# Patient Record
Sex: MALE | Race: WHITE | Employment: FULL TIME | URBAN - METROPOLITAN AREA
[De-identification: names, ages, dates, MRNs, and addresses within clinical notes are randomized per-mention and may not be internally consistent; named-entity substitution may affect disease eponyms.]

---

## 2017-01-18 NOTE — PATIENT INSTRUCTIONS
Preventive Health Recommendations  Male Ages 50   64    Yearly exam:             See your health care provider every year in order to  o   Review health changes.   o   Discuss preventive care.    o   Review your medicines if your doctor has prescribed any.     Have a cholesterol test every 5 years, or more frequently if you are at risk for high cholesterol/heart disease.     Have a diabetes test (fasting glucose) every three years. If you are at risk for diabetes, you should have this test more often.     Have a colonoscopy at age 50, or have a yearly FIT test (stool test). These exams will check for colon cancer.      Talk with your health care provider about whether or not a prostate cancer screening test (PSA) is right for you.    You should be tested each year for STDs (sexually transmitted diseases), if you re at risk.     Shots: Get a flu shot each year. Get a tetanus shot every 10 years.     Nutrition:    Eat at least 5 servings of fruits and vegetables daily.     Eat whole-grain bread, whole-wheat pasta and brown rice instead of white grains and rice.     Talk to your provider about Calcium and Vitamin D.     Lifestyle    Exercise for at least 150 minutes a week (30 minutes a day, 5 days a week). This will help you control your weight and prevent disease.     Limit alcohol to one drink per day.     No smoking.     Wear sunscreen to prevent skin cancer.     See your dentist every six months for an exam and cleaning.     See your eye doctor every 1 to 2 years.      We will send you lab results    Schedule with eye doctor    Advise schedule the upper and lower endoscopy exams    For the tremor, see neurology if desired

## 2017-01-18 NOTE — PROGRESS NOTES
"  SUBJECTIVE:     CC: Jorge Rivas is an 51 year old male who presents for preventative health visit.     Healthy Habits:    Do you get at least three servings of calcium containing foods daily (dairy, green leafy vegetables, etc.)? yes    Amount of exercise or daily activities, outside of work: 2 day(s) per week    Problems taking medications regularly No    Medication side effects: No    Have you had an eye exam in the past two years? no    Do you see a dentist twice per year? yes    Do you have sleep apnea, excessive snoring or daytime drowsiness?no        None    Always had a little shaking/ tremor, worse recently    Decreased libido    Hands shake    Others notice it     No speech / coordination problems    Occasionally vision gets \"screwed up\" for a while    Wondering if something gets in eyes    Goes away after a while    No contacts or glasses    lasik 3 or 4 years ago, not as sharp vision as he would like    Some halos    Not much exercise    Tries to stay active    Lots of stress    Not as much free time    viagra works, not used much      Today's PHQ-2 Score:   PHQ-2 ( 1999 Pfizer) 1/20/2017 2/23/2015   Q1: Little interest or pleasure in doing things 0 0   Q2: Feeling down, depressed or hopeless 0 3   PHQ-2 Score 0 3       Abuse: Current or Past(Physical, Sexual or Emotional)- No  Do you feel safe in your environment - Yes    Social History   Substance Use Topics     Smoking status: Former Smoker     Quit date: 01/01/2006     Smokeless tobacco: Never Used     Alcohol Use: Yes     The patient does not drink >3 drinks per day nor >7 drinks per week.    Last PSA:   PSA   Date Value Ref Range Status   03/10/2015 1.46 0 - 4 ug/L Final       Recent Labs   Lab Test  03/10/15   0702  09/11/09   1556   CHOL  166  164   HDL  43  42   LDL  102  109   TRIG  107  71   CHOLHDLRATIO  3.9  4.0       Reviewed orders with patient. Reviewed health maintenance and updated orders accordingly - Yes    All Histories " "reviewed and updated in Epic.      ROS:  C: NEGATIVE for fever, chills, change in weight  CONSTITUTIONAL:pt would like to lose some wt he states  I: NEGATIVE for worrisome rashes, moles or lesions  EYES: see above   ENT: NEGATIVE for ear, mouth and throat problems  R: NEGATIVE for significant cough or SOB  CV: NEGATIVE for chest pain, palpitations or peripheral edema  GI: NEGATIVE for nausea, abdominal pain, heartburn, or change in bowel habits   male: still erection problems but not needing viagra from [facility]; [facility]  M: NEGATIVE for significant arthralgias or myalgia  N: NEGATIVE for weakness, dizziness or paresthesias  P: NEGATIVE for changes in mood or affect  PSYCHIATRIC: lots of stress        OBJECTIVE:     /81 mmHg  Pulse 63  Temp(Src) 97.9  F (36.6  C) (Oral)  Ht 6' 1.62\" (1.87 m)  Wt 233 lb (105.688 kg)  BMI 30.22 kg/m2  EXAM:  GENERAL: healthy, alert and no distress  HENT: ear canals and TM's normal, nose and mouth without ulcers or lesions  NECK: no adenopathy, no asymmetry, masses, or scars and thyroid normal to palpation  RESP: lungs clear to auscultation - no rales, rhonchi or wheezes  CV: regular rate and rhythm, normal S1 S2, no S3 or S4, no murmur, click or rub, no peripheral edema and peripheral pulses strong  ABDOMEN: soft, nontender, no hepatosplenomegaly, no masses and bowel sounds normal   (male): normal male genitalia without lesions or urethral discharge, no hernia  RECTAL: normal sphincter tone, no rectal masses, prostate normal size, smooth, nontender without nodules or masses  MS: no gross musculoskeletal defects noted, no edema  SKIN: no suspicious lesions or rashes  NEURO: Normal strength and tone, mentation intact and speech normal  PSYCH: mentation appears normal, affect normal/bright  When patient holds his hands out in front he has a moderate tremor, not there all the time.  No resting or pill rolling type tremor      ASSESSMENT/PLAN:     Jorge was seen " "today for physical, health maintenance and other.    Diagnoses and all orders for this visit:    Encounter for preventative adult health care exam with abnormal findings    Gastroesophageal reflux disease, esophagitis presence not specified  -     GASTROENTEROLOGY ADULT REF PROCEDURE ONLY    Screen for colon cancer  -     GASTROENTEROLOGY ADULT REF PROCEDURE ONLY    Screening examination for infectious disease  -     Hepatitis C antibody    Fatigue, unspecified type  -     Comprehensive metabolic panel  -     TSH with free T4 reflex  -     CBC with platelets differential  -     Testosterone total    Screening for prostate cancer  -     Prostate spec antigen screen    CARDIOVASCULAR SCREENING; LDL GOAL LESS THAN 100  -     Lipid panel reflex to direct LDL    Vision problem  -     OPTOMETRY REFERRAL    Tremor  -     NEUROLOGY ADULT REFERRAL    Other orders  -     Cancel: Fecal colorectal cancer screen FIT; Future    Discussed multiple issues with patient  Keep working on healthy diet/exercise   He has never had colonoscopy.  He has had some gerd for long time.  Prudent to do both egd and colonoscopy.  I put in order.  He will call and schedule.  Check labs.   Advised patient see neurology for the tremor  Patient to schedule with eye doctor for full eye exam     COUNSELING:  Reviewed preventive health counseling, as reflected in patient instructions       Regular exercise       Healthy diet/nutrition       Vision screening       Safe sex practices/STD prevention       Colon cancer screening       Prostate cancer screening         reports that he quit smoking about 11 years ago. He has never used smokeless tobacco.    Estimated body mass index is 30.22 kg/(m^2) as calculated from the following:    Height as of this encounter: 6' 1.62\" (1.87 m).    Weight as of this encounter: 233 lb (105.688 kg).   Weight management plan: Discussed healthy diet and exercise guidelines and patient will follow up in 12 months in clinic to " re-evaluate.    Counseling Resources:  ATP IV Guidelines  Pooled Cohorts Equation Calculator  FRAX Risk Assessment  ICSI Preventive Guidelines  Dietary Guidelines for Americans, 2010  USDA's MyPlate  ASA Prophylaxis  Lung CA Screening    Jadyon Mead MD  [facility]      ROS      Physical Exam

## 2017-01-20 ENCOUNTER — OFFICE VISIT (OUTPATIENT)
Dept: FAMILY MEDICINE | Facility: CLINIC | Age: 52
End: 2017-01-20
Payer: COMMERCIAL

## 2017-01-20 VITALS
TEMPERATURE: 97.9 F | HEIGHT: 74 IN | WEIGHT: 233 LBS | DIASTOLIC BLOOD PRESSURE: 81 MMHG | HEART RATE: 63 BPM | BODY MASS INDEX: 29.9 KG/M2 | SYSTOLIC BLOOD PRESSURE: 116 MMHG

## 2017-01-20 DIAGNOSIS — K21.9 GASTROESOPHAGEAL REFLUX DISEASE, ESOPHAGITIS PRESENCE NOT SPECIFIED: ICD-10-CM

## 2017-01-20 DIAGNOSIS — Z12.11 SCREEN FOR COLON CANCER: ICD-10-CM

## 2017-01-20 DIAGNOSIS — Z12.5 SCREENING FOR PROSTATE CANCER: ICD-10-CM

## 2017-01-20 DIAGNOSIS — H54.7 VISION PROBLEM: ICD-10-CM

## 2017-01-20 DIAGNOSIS — R25.1 TREMOR: ICD-10-CM

## 2017-01-20 DIAGNOSIS — Z00.01 ENCOUNTER FOR PREVENTATIVE ADULT HEALTH CARE EXAM WITH ABNORMAL FINDINGS: Primary | ICD-10-CM

## 2017-01-20 DIAGNOSIS — R53.83 FATIGUE, UNSPECIFIED TYPE: ICD-10-CM

## 2017-01-20 DIAGNOSIS — Z11.9 SCREENING EXAMINATION FOR INFECTIOUS DISEASE: ICD-10-CM

## 2017-01-20 DIAGNOSIS — Z13.6 CARDIOVASCULAR SCREENING; LDL GOAL LESS THAN 100: ICD-10-CM

## 2017-01-20 PROCEDURE — 84403 ASSAY OF TOTAL TESTOSTERONE: CPT | Performed by: FAMILY MEDICINE

## 2017-01-20 PROCEDURE — 80061 LIPID PANEL: CPT | Performed by: FAMILY MEDICINE

## 2017-01-20 PROCEDURE — G0103 PSA SCREENING: HCPCS | Performed by: FAMILY MEDICINE

## 2017-01-20 PROCEDURE — 86803 HEPATITIS C AB TEST: CPT | Performed by: FAMILY MEDICINE

## 2017-01-20 PROCEDURE — 99396 PREV VISIT EST AGE 40-64: CPT | Performed by: FAMILY MEDICINE

## 2017-01-20 PROCEDURE — 80050 GENERAL HEALTH PANEL: CPT | Performed by: FAMILY MEDICINE

## 2017-01-20 PROCEDURE — 36415 COLL VENOUS BLD VENIPUNCTURE: CPT | Performed by: FAMILY MEDICINE

## 2017-01-20 ASSESSMENT — PAIN SCALES - GENERAL: PAINLEVEL: NO PAIN (0)

## 2017-01-20 NOTE — NURSING NOTE
"Chief Complaint   Patient presents with     Physical     Health Maintenance     Other     bpa and my chart       Initial /81 mmHg  Pulse 63  Temp(Src) 97.9  F (36.6  C) (Oral)  Ht 6' 1.62\" (1.87 m)  Wt 233 lb (105.688 kg)  BMI 30.22 kg/m2 Estimated body mass index is 30.22 kg/(m^2) as calculated from the following:    Height as of this encounter: 6' 1.62\" (1.87 m).    Weight as of this encounter: 233 lb (105.688 kg).  BP completed using cuff size: regular taken on the left arm  Ale Montgomery CMA      "

## 2017-01-20 NOTE — MR AVS SNAPSHOT
After Visit Summary   1/20/2017    Jorge Rivas    MRN: [id number]           Patient Information     Date Of Birth          1965        Visit Information        Provider Department      1/20/2017 3:40 PM Jaydon Mead MD [facility]        Today's Diagnoses     Gastroesophageal reflux disease, esophagitis presence not specified    -  1     Screen for colon cancer         Screening examination for infectious disease         Fatigue, unspecified type         Screening for prostate cancer         CARDIOVASCULAR SCREENING; LDL GOAL LESS THAN 100         Vision problem         Tremor           Care Instructions      Preventive Health Recommendations  Male Ages 50 - 64    Yearly exam:             See your health care provider every year in order to  o   Review health changes.   o   Discuss preventive care.    o   Review your medicines if your doctor has prescribed any.     Have a cholesterol test every 5 years, or more frequently if you are at risk for high cholesterol/heart disease.     Have a diabetes test (fasting glucose) every three years. If you are at risk for diabetes, you should have this test more often.     Have a colonoscopy at age 50, or have a yearly FIT test (stool test). These exams will check for colon cancer.      Talk with your health care provider about whether or not a prostate cancer screening test (PSA) is right for you.    You should be tested each year for STDs (sexually transmitted diseases), if you re at risk.     Shots: Get a flu shot each year. Get a tetanus shot every 10 years.     Nutrition:    Eat at least 5 servings of fruits and vegetables daily.     Eat whole-grain bread, whole-wheat pasta and brown rice instead of white grains and rice.     Talk to your provider about Calcium and Vitamin D.     Lifestyle    Exercise for at least 150 minutes a week (30 minutes a day, 5 days a week). This will help you control your weight and  prevent disease.     Limit alcohol to one drink per day.     No smoking.     Wear sunscreen to prevent skin cancer.     See your dentist every six months for an exam and cleaning.     See your eye doctor every 1 to 2 years.      We will send you lab results    Schedule with eye doctor    Advise schedule the upper and lower endoscopy exams    For the tremor, see neurology if desired        Follow-ups after your visit        Additional Services     GASTROENTEROLOGY ADULT REF PROCEDURE ONLY       Last Lab Result: CREATININE (mg/dL)       Date                     Value                 09/11/2009               0.99             ----------      WE ARE ORDERING BOTH EGD AND COLONOSCOPY; GERD AND SCREEN COLON CA   There is no weight on file to calculate BMI.      Patient will be contacted to schedule procedure.     Please be aware that coverage of these services is subject to the terms and limitations of your health insurance plan.  Call member services at your health plan with any benefit or coverage questions.  Any procedures must be performed at a [facility] facility OR coordinated by your clinic's referral office.    Please bring the following with you to your appointment:    (1) Any X-Rays, CTs or MRIs which have been performed.  Contact the facility where they were done to arrange for  prior to your scheduled appointment.    (2) List of current medications   (3) This referral request   (4) Any documents/labs given to you for this referral            NEUROLOGY ADULT REFERRAL       Your provider has referred you to: [facility]: [facility] [phone]   [email or web address]  [facility]: [facility] [phone]   [email or web address]  [facility]: [facility] [phone]   [email or web address]  [facility]: [facility] [phone]    [email or web address]  UM: Neu[facility] Clinic [facility] [phone]   [email or web address]  General Neurology    Reason for Referral: Consult    Please be aware that coverage of these services is subject to the terms and limitations of your health insurance plan.  Call member services at your health plan with any benefit or coverage questions.      Please bring the following with you to your appointment:    (1) Any X-Rays, CTs or MRIs which have been performed.  Contact the facility where they were done to arrange for  prior to your scheduled appointment.    (2) List of current medications  (3) This referral request   (4) Any documents/labs given to you for this referral            OPTOMETRY REFERRAL       Your provider has referred you to: [facility]: [facility] [phone]   [email or web address]    Please be aware that coverage of these services is subject to the terms and limitations of your health insurance plan.  Call member services at your health plan with any benefit or coverage questions.      Please bring the following with you to your appointment:    (1) Any X-Rays, CTs or MRIs which have been performed.  Contact the facility where they were done to arrange for  prior to your scheduled appointment.    (2) List of current medications  (3) This referral request   (4) Any documents/labs given to you for this referral                  Who to contact     If you have questions or need follow up information about today's clinic visit or your schedule please contact [facility] directly at [phone].  Normal or non-critical lab and imaging results will be communicated to you by MyChart, letter or phone within 4 business days after the clinic has received the results. If you do not hear from us within 7 days, please contact the clinic through [employer]hart or  "phone. If you have a critical or abnormal lab result, we will notify you by phone as soon as possible.  Submit refill requests through [employer] or call your pharmacy and they will forward the refill request to us. Please allow 3 business days for your refill to be completed.          Additional Information About Your Visit        Paid To Party LLChart Information     VideoPros lets you send messages to your doctor, view your test results, renew your prescriptions, schedule appointments and more. To sign up, go to [email or web address] . Click on \"Log in\" on the left side of the screen, which will take you to the Welcome page. Then click on \"Sign up Now\" on the right side of the page.     You will be asked to enter the access code listed below, as well as some personal information. Please follow the directions to create your username and password.     Your access code is: [email or web address]  Expires: 2017  4:47 PM     Your access code will  in 90 days. If you need help or a new code, please call your [facility] clinic or [phone].        Care EveryWhere ID     This is your Care EveryWhere ID. This could be used by other organizations to access your [facility] medical records  [id number]        Your Vitals Were     Pulse Temperature Height BMI (Body Mass Index)          63 97.9  F (36.6  C) (Oral) 6' 1.62\" (1.87 m) 30.22 kg/m2         Blood Pressure from Last 3 Encounters:   17 116/81   10/14/16 125/82   16 108/73    Weight from Last 3 Encounters:   17 233 lb (105.688 kg)   10/14/16 234 lb (106.142 kg)   16 228 lb (103.42 kg)              We Performed the Following     CBC with platelets differential     Comprehensive metabolic panel     GASTROENTEROLOGY ADULT REF PROCEDURE ONLY     Hepatitis C antibody     Lipid panel reflex to direct LDL     NEUROLOGY ADULT REFERRAL     OPTOMETRY REFERRAL     Prostate spec antigen screen     Testosterone total     TSH with free T4 reflex        Primary Care Provider " Office Phone # Fax #    Jaydon Mead [phone]       [facility] [address]  [place]. \[facility]ospital [facility] and Outlook.\"" [phone]        Thank you!     Thank you for choosing [facility]  for your care. Our goal is always to provide you with excellent care. Hearing back from our patients is one way we can continue to improve our services. Please take a few minutes to complete the written survey that you may receive in the mail after your visit with us. Thank you!             Your Updated Medication List - Protect others around you: Learn how to safely use, store and throw away your medicines at www.disposemymeds.org.          This list is accurate as of: 1/20/17  4:47 PM.  Always use your most recent med list.                   Brand Name Dispense Instructions for use    aspirin 81 MG tablet      Take by mouth daily       DAILY MULTIVITAMIN PO          fluocinonide 0.05 % cream    LIDEX    30 g    Apply sparingly to affected area twice daily for 14 days.  Do not apply to face.       sildenafil 100 MG cap/tab    VIAGRA    6 tablet    Take 0.5-1 tablets ( mg) by mouth daily as needed for erectile dysfunction Take 30 min to 4 hours before intercourse.  Never use with nitroglycerin, terazosin or doxazosin.

## 2017-01-20 NOTE — Clinical Note
[facility]  [address] [place]  [address]  [phone]        January 26, 2017    Jorge Rivas  [address] [id number]        Dear Jorge,    Your cholesterol is mildly elevated.  Keep working on healthy diet/exercise.     Other labs are all fine.    Results for orders placed or performed in visit on 01/20/17   Hepatitis C antibody   Result Value Ref Range    Hepatitis C Antibody  NR     Nonreactive   Assay performance characteristics have not been established for newborns,   infants, and children     Lipid panel reflex to direct LDL   Result Value Ref Range    Cholesterol 203 (H) <200 mg/dL    Triglycerides 106 <150 mg/dL    HDL Cholesterol 47 >39 mg/dL    LDL Cholesterol Calculated 135 (H) <100 mg/dL    Non HDL Cholesterol 156 (H) <130 mg/dL   Comprehensive metabolic panel   Result Value Ref Range    Sodium 139 133 - 144 mmol/L    Potassium 4.3 3.4 - 5.3 mmol/L    Chloride 103 94 - 109 mmol/L    Carbon Dioxide 27 20 - 32 mmol/L    Anion Gap 9 3 - 14 mmol/L    Glucose 87 70 - 99 mg/dL    Urea Nitrogen 22 7 - 30 mg/dL    Creatinine 1.02 0.66 - 1.25 mg/dL    GFR Estimate 77 >60 mL/min/1.7m2    GFR Estimate If Black >90   GFR Calc   >60 mL/min/1.7m2    Calcium 9.0 8.5 - 10.1 mg/dL    Bilirubin Total 0.4 0.2 - 1.3 mg/dL    Albumin 4.2 3.4 - 5.0 g/dL    Protein Total 7.2 6.8 - 8.8 g/dL    Alkaline Phosphatase 62 40 - 150 U/L    ALT 58 0 - 70 U/L    AST 25 0 - 45 U/L   TSH with free T4 reflex   Result Value Ref Range    TSH 2.01 0.40 - 4.00 mU/L   CBC with platelets differential   Result Value Ref Range    WBC 6.4 4.0 - 11.0 10e9/L    RBC Count 4.88 4.4 - 5.9 10e12/L    Hemoglobin 15.1 13.3 - 17.7 g/dL    Hematocrit 43.1 40.0 - 53.0 %    MCV 88 78 - 100 fl    MCH 30.9 26.5 - 33.0 pg    MCHC 35.0 31.5 - 36.5 g/dL    RDW 11.9 10.0 - 15.0 %    Platelet Count 208 150 - 450 10e9/L    Diff Method Automated Method     % Neutrophils  44.8 %    % Lymphocytes 39.5 %    % Monocytes 11.1 %    % Eosinophils 4.1 %    % Basophils 0.5 %    Absolute Neutrophil 2.9 1.6 - 8.3 10e9/L    Absolute Lymphocytes 2.5 0.8 - 5.3 10e9/L    Absolute Monocytes 0.7 0.0 - 1.3 10e9/L    Absolute Eosinophils 0.3 0.0 - 0.7 10e9/L    Absolute Basophils 0.0 0.0 - 0.2 10e9/L   Prostate spec antigen screen   Result Value Ref Range    PSA 1.29 0 - 4 ug/L   Testosterone total   Result Value Ref Range    Testosterone Total 356 240 - 950 ng/dL       If you have any questions please call the clinic at [phone].    Sincerely,    Jaydon BAYL

## 2017-01-23 LAB
ALBUMIN SERPL-MCNC: 4.2 G/DL (ref 3.4–5)
ALP SERPL-CCNC: 62 U/L (ref 40–150)
ALT SERPL W P-5'-P-CCNC: 58 U/L (ref 0–70)
ANION GAP SERPL CALCULATED.3IONS-SCNC: 9 MMOL/L (ref 3–14)
AST SERPL W P-5'-P-CCNC: 25 U/L (ref 0–45)
BASOPHILS # BLD AUTO: 0 10E9/L (ref 0–0.2)
BASOPHILS NFR BLD AUTO: 0.5 %
BILIRUB SERPL-MCNC: 0.4 MG/DL (ref 0.2–1.3)
BUN SERPL-MCNC: 22 MG/DL (ref 7–30)
CALCIUM SERPL-MCNC: 9 MG/DL (ref 8.5–10.1)
CHLORIDE SERPL-SCNC: 103 MMOL/L (ref 94–109)
CHOLEST SERPL-MCNC: 203 MG/DL
CO2 SERPL-SCNC: 27 MMOL/L (ref 20–32)
CREAT SERPL-MCNC: 1.02 MG/DL (ref 0.66–1.25)
DIFFERENTIAL METHOD BLD: NORMAL
EOSINOPHIL # BLD AUTO: 0.3 10E9/L (ref 0–0.7)
EOSINOPHIL NFR BLD AUTO: 4.1 %
ERYTHROCYTE [DISTWIDTH] IN BLOOD BY AUTOMATED COUNT: 11.9 % (ref 10–15)
GFR SERPL CREATININE-BSD FRML MDRD: 77 ML/MIN/1.7M2
GLUCOSE SERPL-MCNC: 87 MG/DL (ref 70–99)
HCT VFR BLD AUTO: 43.1 % (ref 40–53)
HCV AB SERPL QL IA: NORMAL
HDLC SERPL-MCNC: 47 MG/DL
HGB BLD-MCNC: 15.1 G/DL (ref 13.3–17.7)
LDLC SERPL CALC-MCNC: 135 MG/DL
LYMPHOCYTES # BLD AUTO: 2.5 10E9/L (ref 0.8–5.3)
LYMPHOCYTES NFR BLD AUTO: 39.5 %
MCH RBC QN AUTO: 30.9 PG (ref 26.5–33)
MCHC RBC AUTO-ENTMCNC: 35 G/DL (ref 31.5–36.5)
MCV RBC AUTO: 88 FL (ref 78–100)
MONOCYTES # BLD AUTO: 0.7 10E9/L (ref 0–1.3)
MONOCYTES NFR BLD AUTO: 11.1 %
NEUTROPHILS # BLD AUTO: 2.9 10E9/L (ref 1.6–8.3)
NEUTROPHILS NFR BLD AUTO: 44.8 %
NONHDLC SERPL-MCNC: 156 MG/DL
PLATELET # BLD AUTO: 208 10E9/L (ref 150–450)
POTASSIUM SERPL-SCNC: 4.3 MMOL/L (ref 3.4–5.3)
PROT SERPL-MCNC: 7.2 G/DL (ref 6.8–8.8)
PSA SERPL-ACNC: 1.29 UG/L (ref 0–4)
RBC # BLD AUTO: 4.88 10E12/L (ref 4.4–5.9)
SODIUM SERPL-SCNC: 139 MMOL/L (ref 133–144)
TRIGL SERPL-MCNC: 106 MG/DL
TSH SERPL DL<=0.005 MIU/L-ACNC: 2.01 MU/L (ref 0.4–4)
WBC # BLD AUTO: 6.4 10E9/L (ref 4–11)

## 2017-01-25 LAB — TESTOST SERPL-MCNC: 356 NG/DL (ref 240–950)

## 2017-01-25 NOTE — PROGRESS NOTES
Quick Note:    Your cholesterol is mildly elevated. Keep working on healthy diet/exercise.     Other labs are all fine.    Jaydon Mead MD    ______

## 2017-02-09 ENCOUNTER — OFFICE VISIT (OUTPATIENT)
Dept: FAMILY MEDICINE | Facility: CLINIC | Age: 52
End: 2017-02-09
Payer: COMMERCIAL

## 2017-02-09 VITALS
TEMPERATURE: 97.4 F | DIASTOLIC BLOOD PRESSURE: 77 MMHG | BODY MASS INDEX: 30.09 KG/M2 | HEART RATE: 61 BPM | WEIGHT: 232 LBS | SYSTOLIC BLOOD PRESSURE: 118 MMHG | OXYGEN SATURATION: 100 %

## 2017-02-09 DIAGNOSIS — Z11.3 SCREEN FOR STD (SEXUALLY TRANSMITTED DISEASE): Primary | ICD-10-CM

## 2017-02-09 DIAGNOSIS — N52.9 ERECTILE DYSFUNCTION, UNSPECIFIED ERECTILE DYSFUNCTION TYPE: ICD-10-CM

## 2017-02-09 PROCEDURE — 99213 OFFICE O/P EST LOW 20 MIN: CPT | Performed by: PHYSICIAN ASSISTANT

## 2017-02-09 PROCEDURE — 87591 N.GONORRHOEAE DNA AMP PROB: CPT | Performed by: PHYSICIAN ASSISTANT

## 2017-02-09 PROCEDURE — 86780 TREPONEMA PALLIDUM: CPT | Performed by: PHYSICIAN ASSISTANT

## 2017-02-09 PROCEDURE — 87491 CHLMYD TRACH DNA AMP PROBE: CPT | Performed by: PHYSICIAN ASSISTANT

## 2017-02-09 PROCEDURE — 36415 COLL VENOUS BLD VENIPUNCTURE: CPT | Performed by: PHYSICIAN ASSISTANT

## 2017-02-09 PROCEDURE — 87389 HIV-1 AG W/HIV-1&-2 AB AG IA: CPT | Performed by: PHYSICIAN ASSISTANT

## 2017-02-09 PROCEDURE — 87340 HEPATITIS B SURFACE AG IA: CPT | Performed by: PHYSICIAN ASSISTANT

## 2017-02-09 NOTE — MR AVS SNAPSHOT
After Visit Summary   2/9/2017    Jorge Rivas    MRN: [id number]           Patient Information     Date Of Birth          1965        Visit Information        Provider Department      2/9/2017 3:20 PM Nayeli Guidry PA-C [facility]        Today's Diagnoses     Screen for STD (sexually transmitted disease)    -  1     Erectile dysfunction, unspecified erectile dysfunction type            Follow-ups after your visit        Additional Services     UROLOGY ADULT REFERRAL       Your provider has referred you to: G: [facility] [phone]   [email or web address]    Please be aware that coverage of these services is subject to the terms and limitations of your health insurance plan.  Call member services at your health plan with any benefit or coverage questions.      Please bring the following with you to your appointment:    (1) Any X-Rays, CTs or MRIs which have been performed.  Contact the facility where they were done to arrange for  prior to your scheduled appointment.    (2) List of current medications  (3) This referral request   (4) Any documents/labs given to you for this referral                  Your next 10 appointments already scheduled     Feb 09, 2017  3:20 PM   SHORT with Nayeli Guidry PA-C   [facility] ([facility])    [address] [place] [id number] [phone]              Who to contact     If you have questions or need follow up information about today's clinic visit or your schedule please contact [facility] directly at [phone].  Normal or non-critical lab and imaging results will be communicated to you by MyChart, letter or phone within 4 business days after the clinic has received the results. If you do not hear from us within 7 days, please contact the clinic through MyChart or phone.  "If you have a critical or abnormal lab result, we will notify you by phone as soon as possible.  Submit refill requests through MollyWatr or call your pharmacy and they will forward the refill request to us. Please allow 3 business days for your refill to be completed.          Additional Information About Your Visit        Yottaahart Information     MollyWatr lets you send messages to your doctor, view your test results, renew your prescriptions, schedule appointments and more. To sign up, go to [email or web address] . Click on \"Log in\" on the left side of the screen, which will take you to the Welcome page. Then click on \"Sign up Now\" on the right side of the page.     You will be asked to enter the access code listed below, as well as some personal information. Please follow the directions to create your username and password.     Your access code is: [email or web address]  Expires: 2017  4:47 PM     Your access code will  in 90 days. If you need help or a new code, please call your [place] clinic or [phone].        Care EveryWhere ID     This is your Care EveryWhere ID. This could be used by other organizations to access your [place] medical records  [id number]        Your Vitals Were     Pulse Temperature Pulse Oximetry             61 97.4  F (36.3  C) (Oral) 100%          Blood Pressure from Last 3 Encounters:   17 118/77   17 116/81   10/14/16 125/82    Weight from Last 3 Encounters:   17 232 lb (105.235 kg)   17 233 lb (105.688 kg)   10/14/16 234 lb (106.142 kg)              We Performed the Following     Anti Treponema     Chlamydia trachomatis PCR     Hepatitis B surface antigen     HIV Antigen Antibody Combo     Neisseria gonorrhoeae PCR     UROLOGY ADULT REFERRAL        Primary Care Provider Office Phone # Fax #    Jaydon Mead [phone]       [address] [address]        Thank you!     Thank you for " choosing [facility]  for your care. Our goal is always to provide you with excellent care. Hearing back from our patients is one way we can continue to improve our services. Please take a few minutes to complete the written survey that you may receive in the mail after your visit with us. Thank you!             Your Updated Medication List - Protect others around you: Learn how to safely use, store and throw away your medicines at www.disposemymeds.org.          This list is accurate as of: 2/9/17  3:18 PM.  Always use your most recent med list.                   Brand Name Dispense Instructions for use    aspirin 81 MG tablet      Take by mouth daily       DAILY MULTIVITAMIN PO          fluocinonide 0.05 % cream    LIDEX    30 g    Apply sparingly to affected area twice daily for 14 days.  Do not apply to face.       sildenafil 100 MG cap/tab    VIAGRA    6 tablet    Take 0.5-1 tablets ( mg) by mouth daily as needed for erectile dysfunction Take 30 min to 4 hours before intercourse.  Never use with nitroglycerin, terazosin or doxazosin.

## 2017-02-09 NOTE — LETTER
[facility]  [address] [place]  [address]  [phone]        February 14, 2017    Jorge Rivas  [address] [address]        Dear Jorge,    Your STD testing is all negative.     Results for orders placed or performed in visit on 02/09/17   Hepatitis B surface antigen   Result Value Ref Range    Hep B Surface Agn Nonreactive NR   HIV Antigen Antibody Combo   Result Value Ref Range    HIV Antigen Antibody Combo  NR     Nonreactive   HIV-1 p24 Ag & HIV-1/HIV-2 Ab Not Detected     Anti Treponema   Result Value Ref Range    Treponema pallidum Antibody Negative NEG   Neisseria gonorrhoeae PCR   Result Value Ref Range    Specimen Descrip Urine     N Gonorrhea PCR  NEG     Negative   Negative for N. gonorrhoeae rRNA by transcription mediated amplification.   A negative result by transcription mediated amplification does not preclude the   presence of N. gonorrhoeae infection because results are dependent on proper   and adequate collection, absence of inhibitors, and sufficient rRNA to be   detected.     Chlamydia trachomatis PCR   Result Value Ref Range    Specimen Description Urine     Chlamydia Trachomatis PCR  NEG     Negative   Negative for C. trachomatis rRNA by transcription mediated amplification.   A negative result by transcription mediated amplification does not preclude the   presence of C. trachomatis infection because results are dependent on proper   and adequate collection, absence of inhibitors, and sufficient rRNA to be   detected.         If you have any questions please call the clinic at [phone].    Sincerely,    Nayeli PFEIFFER

## 2017-02-09 NOTE — PROGRESS NOTES
SUBJECTIVE:                                                    Jorge Rivas is a 51 year old male who presents to clinic today for the following health issues:    Patient here for STD screen for preventive care. No discharge, urination, or other symptoms.    Started dating again. Recently had a negative Hep C. May have had the Hep B injections.     History of erectile dysfunction that he takes Viagra for. Hasn't taken it often in the past 2 years but [facility]. Notes family history of erectile dysfunction.    Many questions regarding lipid results and etiology of his erectile dysfunction.    Appears to have more questions but declined further concerns at this time.    Problem list and histories reviewed & adjusted, as indicated.  Additional history: as documented    Problem list, Medication list, Allergies, and Medical/Social/Surgical histories reviewed in EPIC and updated as appropriate.    ROS:  Constitutional, HEENT, cardiovascular, pulmonary, gi and gu systems are negative, except as otherwise noted.    OBJECTIVE:                                                    /77 mmHg  Pulse 61  Temp(Src) 97.4  F (36.3  C) (Oral)  Wt 232 lb (105.235 kg)  SpO2 100%  Body mass index is 30.09 kg/(m^2).  GENERAL: healthy, alert and no distress    Diagnostic Test Results:  No results found for this or any previous visit (from the past 24 hour(s)).     ASSESSMENT/PLAN:                                                    1. Screen for STD (sexually transmitted disease)  Patient would like STD testing.  - Hepatitis B surface antigen  - Neisseria gonorrhoeae PCR  - Chlamydia trachomatis PCR  - HIV Antigen Antibody Combo  - Anti Treponema    2. Erectile dysfunction, unspecified erectile dysfunction type  Many questions about etiology of his erectile dysfunction. Will put in urology referral that patient can schedule at his convenience.  - UROLOGY ADULT REFERRAL    Follow up with PCP as  scheduled.    KAYCEE Serrato-student  KAYCEE Sargent-C  [facility]  The student Magui Schmidt PAS2 acted as a scribe and the encounter documented above was completely performed by myself and the documentation reflects the work I have performed today.   Nayeli JOSEC   >50% of the visit spent in counseling and coordination of care. Visit length 15 minutes.

## 2017-02-09 NOTE — NURSING NOTE
"Chief Complaint   Patient presents with     STD     check        Initial /77 mmHg  Pulse 61  Temp(Src) 97.4  F (36.3  C) (Oral)  Wt 232 lb (105.235 kg)  SpO2 100% Estimated body mass index is 30.09 kg/(m^2) as calculated from the following:    Height as of 1/20/17: 6' 1.62\" (1.87 m).    Weight as of this encounter: 232 lb (105.235 kg).  Medication Reconciliation: complete   Kristin Orozco CMA      "

## 2017-02-10 LAB
C TRACH DNA SPEC QL NAA+PROBE: NORMAL
HBV SURFACE AG SERPL QL IA: NONREACTIVE
HIV 1+2 AB+HIV1 P24 AG SERPL QL IA: NORMAL
N GONORRHOEA DNA SPEC QL NAA+PROBE: NORMAL
SPECIMEN SOURCE: NORMAL
SPECIMEN SOURCE: NORMAL
T PALLIDUM IGG+IGM SER QL: NEGATIVE

## 2017-06-01 ENCOUNTER — TELEPHONE (OUTPATIENT)
Dept: FAMILY MEDICINE | Facility: CLINIC | Age: 52
End: 2017-06-01

## 2017-06-01 NOTE — LETTER
June 1, 2017    Jorge Rivas  [address] [address]    Dear Jorge    We care about your health and have reviewed your health plan. We have reviewed your medical conditions, medication list, and lab results and are making recommendations based on this review, to better manage your health.    You are in particular need of attention regarding:  - Scheduling a Colon Cancer Screening (Colonoscopy only) [phone]      Here is a list of Health Maintenance topics that are due now or due soon:  Health Maintenance Due   Topic Date Due     COLON CANCER SCREEN (SYSTEM ASSIGNED)  11/07/2014     We will be calling you in the next couple of weeks to help you schedule any appointments that are needed.  Please call us at [phone] (or use Lemoptix) to address the above recommendations.     Thank you for trusting [facility] and we appreciate the opportunity to serve you.  We look forward to supporting your healthcare needs in the future.    Healthy Regards,    MD Ale Mays CMA

## 2017-06-01 NOTE — TELEPHONE ENCOUNTER
Panel Management Review      Patient has the following on his problem list: None      Composite cancer screening  Chart review shows that this patient is due/due soon for the following Colonoscopy  Summary:    Patient is due/failing the following:   COLONOSCOPY    Action needed:   Patient needs referral/order: colon order done.    Type of outreach:    Sent letter. 06/01/2017    Questions for provider review:    None                                                                                                                                    Ale [facility]       Chart routed to Care Team .

## 2017-06-09 NOTE — TELEPHONE ENCOUNTER
I called and left message for patient to call back regarding scheduling colonoscopy  Ale Montgomery CMA

## 2017-06-30 NOTE — TELEPHONE ENCOUNTER
I called and left message for patient to call back regarding scheduling a colonoscopy. Final letter mailed  Ale Montgomery CMA

## 2017-07-19 ENCOUNTER — TELEPHONE (OUTPATIENT)
Dept: GASTROENTEROLOGY | Facility: OUTPATIENT CENTER | Age: 52
End: 2017-07-19

## 2017-07-19 DIAGNOSIS — Z12.11 ENCOUNTER FOR SCREENING COLONOSCOPY: Primary | ICD-10-CM

## 2017-07-19 NOTE — TELEPHONE ENCOUNTER
Patient taking any blood thinners ? 81 mg aspirin    Heart disease ? denies    Lung disease ?denies     Sleep apnea ?denies    Diabetic ?denies    Kidney disease ?denies    Dialysis ? n/a    Electronic implanted medical devices ? denies    Are you taking any narcotic pain medication ? no  What is your daily dosage ?    PTSD ? n/a    Prep instructions reviewed with patient ? Instructions,  policy, MAC sedation plan reviewed. Advised patient to have someone stay with him post exam    Ph[facility]acy : [facility]    Indication for procedure :   Diagnoses      Gastroesophageal reflux disease, esophagitis presence not specified [K21.9]         Screen for colon cancer [Z12.11]             Referring provider :  Providers      Authorizing Provider Encounter Provider     Jaydon Mead MD

## 2017-07-25 ENCOUNTER — DOCUMENTATION ONLY (OUTPATIENT)
Dept: GASTROENTEROLOGY | Facility: OUTPATIENT CENTER | Age: 52
End: 2017-07-25

## 2017-07-25 ENCOUNTER — TRANSFERRED RECORDS (OUTPATIENT)
Dept: HEALTH INFORMATION MANAGEMENT | Facility: CLINIC | Age: 52
End: 2017-07-25

## 2017-07-28 LAB — COPATH REPORT: NORMAL

## 2017-09-12 ENCOUNTER — OFFICE VISIT (OUTPATIENT)
Dept: FAMILY MEDICINE | Facility: CLINIC | Age: 52
End: 2017-09-12
Payer: COMMERCIAL

## 2017-09-12 VITALS
HEART RATE: 67 BPM | DIASTOLIC BLOOD PRESSURE: 72 MMHG | BODY MASS INDEX: 32.43 KG/M2 | TEMPERATURE: 98.3 F | WEIGHT: 250 LBS | SYSTOLIC BLOOD PRESSURE: 115 MMHG | OXYGEN SATURATION: 97 %

## 2017-09-12 DIAGNOSIS — M77.8 RIGHT WRIST TENDINITIS: ICD-10-CM

## 2017-09-12 DIAGNOSIS — M79.644 THUMB PAIN, RIGHT: Primary | ICD-10-CM

## 2017-09-12 PROCEDURE — 99213 OFFICE O/P EST LOW 20 MIN: CPT | Performed by: NURSE PRACTITIONER

## 2017-09-12 ASSESSMENT — PAIN SCALES - GENERAL: PAINLEVEL: SEVERE PAIN (6)

## 2017-09-12 NOTE — PROGRESS NOTES
SUBJECTIVE:   Jorge Rivas is a 51 year old male who presents to clinic today for the following health issues:      Joint Pain    Onset: Since Labor day weekend    Description:   Location: Right hand mostly and his left hand a little around his thumb joint.  Character: Dull ache    Intensity: moderate    Progression of Symptoms: worse    Accompanying Signs & Symptoms:  Other symptoms: weakness of his hand, his     History:   Previous similar pain: no       Precipitating factors:   Trauma or overuse: no     Alleviating factors:  Improved by: nothing and Ibuprofen    Therapies Tried and outcome: a little bit    Pain in bilateral hands (R>L)  Thumb joints  Developed quickly 1 week ago  No other joint pain  No known tick bites  Denies history of injury  No rashes  Weak, painful  on the right  Denies fever, chills, nausea, vomiting  Pain improved with ibuprofen  Pain improving over past week    Problem list and histories reviewed & adjusted, as indicated.  Additional history: none    Patient Active Problem List   Diagnosis     CAD (coronary artery disease)     CARDIOVASCULAR SCREENING; LDL GOAL LESS THAN 100     Erectile dysfunction, unspecified erectile dysfunction type     Past Surgical History:   Procedure Laterality Date     C REPAIR CRUCIATE LIGAMENT,KNEE  late 90s    R knee       Social History   Substance Use Topics     Smoking status: Former Smoker     Quit date: 1/1/2006     Smokeless tobacco: Never Used     Alcohol use 0.0 oz/week     0 Standard drinks or equivalent per week      Comment: variable     Family History   Problem Relation Age of Onset     DIABETES Mother      Cardiovascular Mother      Prostate Cancer Paternal Uncle              Reviewed and updated as needed this visit by clinical staffTobacco  Allergies  Meds  Med Hx  Surg Hx  Fam Hx  Soc Hx      Reviewed and updated as needed this visit by Provider         ROS:  Noncontributory except as above    OBJECTIVE:     BP  115/72 (BP Location: Right arm, Patient Position: Chair, Cuff Size: Adult Large)  Pulse 67  Temp 98.3  F (36.8  C) (Oral)  Wt 250 lb (113.4 kg)  SpO2 97%  BMI 32.43 kg/m2  Body mass index is 32.43 kg/(m^2).  GENERAL: healthy, alert and no distress  MS: Upper extremity strength 5/5 and symmetric. Negative squeeze test bilateral. No pain to palpation MCP joints. Negative finkelstein test bilaterally. Pain to palpation right distal radius. No swelling or masses  SKIN: no suspicious lesions or rashes,no erythema or ecchymosis  NEURO: Normal strength and tone, mentation intact and speech normal    Diagnostic Test Results:  none     ASSESSMENT/PLAN:       ICD-10-CM    1. Thumb pain, right M79.644    2. Right wrist tendinitis M77.8      Other than pain to deep palpation right distal radius, exam mostly unremarkable  Suspect tendinitis d/t overuse  Self care measures reviewed  Recommend NSAIDs, ice, elevation  May take 3-4 weeks to heal    ELVIS Youssef CNP  [facility]

## 2017-09-12 NOTE — PATIENT INSTRUCTIONS
You can take ibuprofen 600 mg every 6 hours as needed for pain  Go to acupuncture appointment on Thursday  Try warm compress

## 2017-09-12 NOTE — MR AVS SNAPSHOT
"              After Visit Summary   2017    Jorge Rivas    MRN: [id number]           Patient Information     Date Of Birth          1965        Visit Information        Provider Department      2017 5:20 PM Ragini Bridges APRN CNP [facility]        Care Instructions    You can take ibuprofen 600 mg every 6 hours as needed for pain  Go to acupuncture appointment on Thursday  Try warm compress          Follow-ups after your visit        Who to contact     If you have questions or need follow up information about today's clinic visit or your schedule please contact [facility] directly at [phone].  Normal or non-critical lab and imaging results will be communicated to you by Runtastichart, letter or phone within 4 business days after the clinic has received the results. If you do not hear from us within 7 days, please contact the clinic through Runtastichart or phone. If you have a critical or abnormal lab result, we will notify you by phone as soon as possible.  Submit refill requests through Coinkite or call your pharmacy and they will forward the refill request to us. Please allow 3 business days for your refill to be completed.          Additional Information About Your Visit        MyChart Information     Coinkite lets you send messages to your doctor, view your test results, renew your prescriptions, schedule appointments and more. To sign up, go to [email or web address] . Click on \"Log in\" on the left side of the screen, which will take you to the Welcome page. Then click on \"Sign up Now\" on the right side of the page.     You will be asked to enter the access code listed below, as well as some personal information. Please follow the directions to create your username and password.     Your access code is: [email or web address]  Expires: 2017  6:20 PM     Your access code will  in 90 days. If you need help or a new code, please call " your [place] clinic or [phone].        Care EveryWhere ID     This is your Care EveryWhere ID. This could be used by other organizations to access your [place] medical records  [id number]        Your Vitals Were     Pulse Temperature Pulse Oximetry BMI (Body Mass Index)          67 98.3  F (36.8  C) (Oral) 97% 32.43 kg/m2         Blood Pressure from Last 3 Encounters:   09/12/17 115/72   02/09/17 118/77   01/20/17 116/81    Weight from Last 3 Encounters:   09/12/17 250 lb (113.4 kg)   02/09/17 232 lb (105.2 kg)   01/20/17 233 lb (105.7 kg)              Today, you had the following     No orders found for display       Primary Care Provider Office Phone # Fax #    Jaydon Mead [phone]       [address] [facility] [address]        Equal Access to Services     [facility] : Hadii nia ku hadasho Soomaali, waaxda luqadaha, qaybta kaalmada adeegyada, moses aguirrein jun berger . So [facility] [phone].    ATENCIÓN: Si habla español, tiene a espino disposición servicios gratuitos de asistencia lingüística. Llame al [phone].    We comply with applicable federal civil rights laws and [place] laws. We do not discriminate on the basis of race, color, national origin, age, disability sex, sexual orientation or gender identity.            Thank you!     Thank you for choosing [facility]  for your care. Our goal is always to provide you with excellent care. Hearing back from our patients is one way we can continue to improve our services. Please take a few minutes to complete the written survey that you may receive in the mail after your visit with us. Thank you!             Your Updated Medication List - Protect others around you: Learn how to safely use, store and throw away your medicines at www.disposemymeds.org.          This list is accurate as of: 9/12/17  6:21 PM.  Always use your most recent med list.                   Brand Name  Dispense Instructions for use Diagnosis    aspirin 81 MG tablet      Take by mouth daily        DAILY MULTIVITAMIN PO           fluocinonide 0.05 % cream    LIDEX    30 g    Apply sparingly to affected area twice daily for 14 days.  Do not apply to face.    Eczema       sildenafil 100 MG cap/tab    VIAGRA    6 tablet    Take 0.5-1 tablets ( mg) by mouth daily as needed for erectile dysfunction Take 30 min to 4 hours before intercourse.  Never use with nitroglycerin, terazosin or doxazosin.    ED (erectile dysfunction)

## 2017-09-12 NOTE — NURSING NOTE
"Chief Complaint   Patient presents with     Musculoskeletal Problem     hand pain       Initial /72 (BP Location: Right arm, Patient Position: Chair, Cuff Size: Adult Large)  Pulse 67  Temp 98.3  F (36.8  C) (Oral)  Wt 250 lb (113.4 kg)  SpO2 97%  BMI 32.43 kg/m2 Estimated body mass index is 32.43 kg/(m^2) as calculated from the following:    Height as of 1/20/17: 6' 1.62\" (1.87 m).    Weight as of this encounter: 250 lb (113.4 kg).  Medication Reconciliation: complete.  SETH Villanueva MA      "

## 2018-02-13 ENCOUNTER — OFFICE VISIT (OUTPATIENT)
Dept: FAMILY MEDICINE | Facility: CLINIC | Age: 53
End: 2018-02-13
Payer: COMMERCIAL

## 2018-02-13 VITALS
OXYGEN SATURATION: 96 % | HEART RATE: 83 BPM | SYSTOLIC BLOOD PRESSURE: 114 MMHG | WEIGHT: 250 LBS | TEMPERATURE: 96.9 F | BODY MASS INDEX: 32.43 KG/M2 | DIASTOLIC BLOOD PRESSURE: 73 MMHG

## 2018-02-13 DIAGNOSIS — S76.311A RIGHT HAMSTRING MUSCLE STRAIN, INITIAL ENCOUNTER: Primary | ICD-10-CM

## 2018-02-13 PROCEDURE — 99213 OFFICE O/P EST LOW 20 MIN: CPT | Performed by: FAMILY MEDICINE

## 2018-02-13 NOTE — MR AVS SNAPSHOT
"              After Visit Summary   2018    Jorge Rivas    MRN: [id number]           Patient Information     Date Of Birth          1965        Visit Information        Provider Department      2018 9:00 AM Cody Swift MD [facility]        Today's Diagnoses     Right hamstring muscle strain, initial encounter    -  1       Follow-ups after your visit        Who to contact     If you have questions or need follow up information about today's clinic visit or your schedule please contact [facility] directly at [phone].  Normal or non-critical lab and imaging results will be communicated to you by [employer], letter or phone within 4 business days after the clinic has received the results. If you do not hear from us within 7 days, please contact the clinic through [employer] or phone. If you have a critical or abnormal lab result, we will notify you by phone as soon as possible.  Submit refill requests through Prevoty or call your pharmacy and they will forward the refill request to us. Please allow 3 business days for your refill to be completed.          Additional Information About Your Visit        MyChart Information     Prevoty lets you send messages to your doctor, view your test results, renew your prescriptions, schedule appointments and more. To sign up, go to [email or web address] . Click on \"Log in\" on the left side of the screen, which will take you to the Welcome page. Then click on \"Sign up Now\" on the right side of the page.     You will be asked to enter the access code listed below, as well as some personal information. Please follow the directions to create your username and password.     Your access code is: [email or web address]  Expires: 2018 10:05 AM     Your access code will  in 90 days. If you need help or a new code, please call your [facility] or [phone].        Care EveryWhere ID     This is your " Care EveryWhere ID. This could be used by other organizations to access your [facility] medical records  [id number]        Your Vitals Were     Pulse Temperature Pulse Oximetry BMI (Body Mass Index)          83 96.9  F (36.1  C) (Oral) 96% 32.43 kg/m2         Blood Pressure from Last 3 Encounters:   02/13/18 114/73   09/12/17 115/72   02/09/17 118/77    Weight from Last 3 Encounters:   02/13/18 250 lb (113.4 kg)   09/12/17 250 lb (113.4 kg)   02/09/17 232 lb (105.2 kg)              Today, you had the following     No orders found for display       Primary Care Provider Office Phone # Fax #    Jaydon Mead [phone]       [address] [facility] [address]        Equal Access to Services     NORA LANCASTER : Hadii nia larsono Soconsuelo, waaxda luqadaha, qaybta kaalmada adeberta, moses berger . So [facility] [phone].    ATENCIÓN: Si habla español, tiene a espino disposición servicios gratuitos de asistencia lingüística. Annia al [phone].    We comply with applicable federal civil rights laws and [place] laws. We do not discriminate on the basis of race, color, national origin, age, disability, sex, sexual orientation, or gender identity.            Thank you!     Thank you for choosing [facility]  for your care. Our goal is always to provide you with excellent care. Hearing back from our patients is one way we can continue to improve our services. Please take a few minutes to complete the written survey that you may receive in the mail after your visit with us. Thank you!             Your Updated Medication List - Protect others around you: Learn how to safely use, store and throw away your medicines at www.disposemymeds.org.          This list is accurate as of 2/13/18 10:05 AM.  Always use your most recent med list.                   Brand Name Dispense Instructions for use Diagnosis    aspirin 81 MG tablet      Take by mouth  daily        DAILY MULTIVITAMIN PO           fluocinonide 0.05 % cream    LIDEX    30 g    Apply sparingly to affected area twice daily for 14 days.  Do not apply to face.    Eczema       sildenafil 100 MG tablet    VIAGRA    6 tablet    Take 0.5-1 tablets ( mg) by mouth daily as needed for erectile dysfunction Take 30 min to 4 hours before intercourse.  Never use with nitroglycerin, terazosin or doxazosin.    ED (erectile dysfunction)

## 2018-02-13 NOTE — PROGRESS NOTES
SUBJECTIVE:   Jorge Rivas is a 52 year old male who presents to clinic today for the following health issues:        Onset: Last Wednesday    Description:   Location: Right Hamstring  Character: Sharp and Ache    Intensity: moderate - severe    Progression of Symptoms: worse to intermittent    Accompanying Signs & Symptoms:  Other symptoms: radiation of pain to Right knee and Bruising    History:   Previous similar pain: no       Precipitating factors:   Trauma or overuse: YES    Alleviating factors:  Improved by: Hot Tub, swimming pool and IBU    States ibuprofen is bothering his stomach     Has been to someone who is doing acupuncture and cupping   He has been using ice, but when he used heat it got better   He applied ace to the thigh and that increased his pain     O: /73 (BP Location: Right arm, Patient Position: Sitting, Cuff Size: Adult Large)  Pulse 83  Temp 96.9  F (36.1  C) (Oral)  Wt 250 lb (113.4 kg)  SpO2 96%  BMI 32.43 kg/m2    Patient is limping   He has pain in the distal thigh over the Hamstring   He has ecchymosis from the proximal thigh down to mid calf   No hematoma felt     No redness or warmth   Extension of the knee to 10 degrees and flexion to 80 degrees     Patient not braced   Several rectangular outlines on the distal thigh (probably from the cupping)         ICD-10-CM    1. Right hamstring muscle strain, initial encounter S76.311A        Heat   Stretch   If worsens, physical therapy   Start active rom now   Increase activity in 3 weeks     Patient was told to call if he has increased redness, warmth swelling or pain   Cautioned against inactivity which would predispose him to thrombosis  Problem list and histories reviewed & adjusted, as indicated.

## 2018-02-13 NOTE — NURSING NOTE
"Chief Complaint   Patient presents with     Pain       Initial /73 (BP Location: Right arm, Patient Position: Sitting, Cuff Size: Adult Large)  Pulse 83  Temp 96.9  F (36.1  C) (Oral)  Wt 250 lb (113.4 kg)  SpO2 96%  BMI 32.43 kg/m2 Estimated body mass index is 32.43 kg/(m^2) as calculated from the following:    Height as of 1/20/17: 6' 1.62\" (1.87 m).    Weight as of this encounter: 250 lb (113.4 kg).  Medication Reconciliation: complete   Rosa Guerrier MA      "

## 2018-02-14 ENCOUNTER — TELEPHONE (OUTPATIENT)
Dept: FAMILY MEDICINE | Facility: CLINIC | Age: 53
End: 2018-02-14

## 2018-02-14 DIAGNOSIS — S76.319A HAMSTRING MUSCLE STRAIN, UNSPECIFIED LATERALITY, INITIAL ENCOUNTER: Primary | ICD-10-CM

## 2018-02-14 NOTE — TELEPHONE ENCOUNTER
Reason for Call: Request for an order or referral:    Order or referral being requested: Orthopedist    Date needed: as soon as possible    Has the patient been seen by the PCP for this problem? YES    Additional comments: Patient states he saw Dr. HAGEN the other day regarding his hamstring, and he would like a referral to ortho    Phone number Patient can be reached at:  Home number on file [phone] (home)    Best Time:  asap    Can we leave a detailed message on this number?  YES    Call taken on 2/14/2018 at 4:27 PM by Rui Nguyen

## 2018-02-14 NOTE — TELEPHONE ENCOUNTER
Routing to PCP to review and advise.  See below patient request for ortho referral    Heidi Duarte RN  [facility]

## 2018-02-16 NOTE — TELEPHONE ENCOUNTER
Left message with [employer] [facility] phone number to schedule ortho appointment. LEAH Bejarano

## 2018-02-20 ENCOUNTER — OFFICE VISIT (OUTPATIENT)
Dept: ORTHOPEDICS | Facility: CLINIC | Age: 53
End: 2018-02-20
Payer: COMMERCIAL

## 2018-02-20 VITALS
DIASTOLIC BLOOD PRESSURE: 84 MMHG | SYSTOLIC BLOOD PRESSURE: 138 MMHG | HEART RATE: 64 BPM | HEIGHT: 74 IN | WEIGHT: 250 LBS | RESPIRATION RATE: 14 BRPM | BODY MASS INDEX: 32.08 KG/M2

## 2018-02-20 DIAGNOSIS — S76.312A TEAR OF LEFT HAMSTRING, INITIAL ENCOUNTER: Primary | ICD-10-CM

## 2018-02-20 PROCEDURE — 99203 OFFICE O/P NEW LOW 30 MIN: CPT | Performed by: ORTHOPAEDIC SURGERY

## 2018-02-20 ASSESSMENT — PAIN SCALES - GENERAL: PAINLEVEL: MILD PAIN (2)

## 2018-02-20 NOTE — LETTER
2/20/2018         RE: Jorge Rivas  [address] [facility] [address]        Dear Colleague,    Thank you for referring your patient, Jorge Rivas, to the [facility]. Please see a copy of my visit note below.    Jorge Rivas is 52 year old male who is seen in consultation at the request of Dr. Venessa Swift for right lower extremity injury that occurred on 02/07/18. It has been approximately 2 weeks. The patient was trying to perform a forward kick and felt a cramp of the distal hamstring. He was unable to continue the activity but was able to ambulate. The patient saw Dr. Swift after he tried icing and cupping who recommended heating, stretching, and if symptoms worsen, physical therapy.     Present symptoms: He points ot the area of the mid hamstring where a majority of his pain was. He also has pain of his IT band and calf lately.  Treatments up to this point: hot tub, swimming pool, icing, heating, cupping, acupuncture, elevating and NSAIDs    PAST MEDICAL HISTORY:   Past Medical History:   Diagnosis Date     History of MI (myocardial infarction)     [facility]     PAST SURGICAL HISTORY:  Orthopedic surgery (ACL R Knee;Tendon left ankle).  Past Surgical History:   Procedure Laterality Date     C REPAIR CRUCIATE LIGAMENT,KNEE  late 90s    R knee     FAMILY HISTORY:   Family History   Problem Relation Age of Onset     DIABETES Mother      Cardiovascular Mother      Prostate Cancer Paternal Uncle      SOCIAL HISTORY:   Social History   Substance Use Topics     Smoking status: Former Smoker     Quit date: 1/1/2006     Smokeless tobacco: Never Used     Alcohol use 0.0 oz/week     0 Standard drinks or equivalent per week      Comment: variable     REVIEW OF SYSTEMS:  CONSTITUTIONAL:  NEGATIVE for fever, chills, change in weight  INTEGUMENTARY/SKIN:  NEGATIVE for worrisome rashes, moles or lesions  EYES:  NEGATIVE for vision changes or  "irritation  ENT/MOUTH:  NEGATIVE for ear, mouth and throat problems  RESP:  NEGATIVE for significant cough or SOB  BREAST:  NEGATIVE for masses, tenderness or discharge  CV:  NEGATIVE for chest pain, palpitations or peripheral edema  GI:  NEGATIVE for nausea, abdominal pain, heartburn, or change in bowel habits  :  NEGATIVE  MUSCULOSKELETAL:  See HPI above  NEURO:  NEGATIVE for weakness, dizziness or paresthesias  ENDOCRINE:  NEGATIVE for temperature intolerance, skin/hair changes  HEME/ALLERGY/IMMUNE:  NEGATIVE for bleeding problems  PSYCHIATRIC:  NEGATIVE for changes in mood or affect    PHYSICAL EXAM:  /84  Resp 14  Ht 1.87 m (6' 1.62\")  Wt 113.4 kg (250 lb)  BMI 32.43 kg/m2  GENERAL APPEARANCE: healthy, alert and no distress   SKIN: no suspicious lesions or rashes  NEURO: Normal strength and tone, mentation intact and speech normal  VASCULAR: good pulses, and cappillary refill   LYMPH: no lymphadenopathy   PSYCH:  mentation appears normal and affect normal/bright  RESP: no increased work of breathing    RIGHT LOWER EXTREMITY PAIN:  Gait: Walks with a normal gait  Inspection: Extensive ecchymosis of posterior thigh extending up to the buttock and down into the calf. There is some swelling of the calf and the posterior thigh  Palpation:The calf is soft and non-tender. I can feel one of the medial hamstrings intact. Semitendinosus is difficult to palpate. I don't feel any gaps in the hamstring.  Non-tender: Ischial tuberosity although he just had pain start in that area.  Tender: Along lateral hamstring.  Effusion: Fullness in the mid-aspect of the lateral hamstring  ROM: Even with knee and hip flexed at 90 degrees, he has pain on the medial side of the hamstrings.. Extension to 70 degrees is too painful for him to bear.     Impression:   Medial hamstring tear, right side    Plan:  I recommended the patient start gentle stretching and strengthening exercises in 1-2 weeks from now. I ordered PT to aid in " the process. The patient can proceed with heat and cold therapies as needed. Tubigrip provided and wrapping and NSAIDs as needed over the next month. All questions were answered.    Return to clinic: MÓNICA Ramos MD  Dept. Orthopedic Surgery  [facility]    This document serves as a record of the services and decisions personally performed and made by Dr. CECIL Ramos MD. It was created on his behalf by Bonilla Jensen, a trained medical scribe. The creation of this record is based on the provider's personal observations and the statements of the patient. This document has been checked and approved by the attending provider.   Bonilla Jensen February 20, 2018 4:12 PM    Again, thank you for allowing me to participate in the care of your patient.        Sincerely,        Fish Ramos MD

## 2018-02-20 NOTE — NURSING NOTE
"Chief Complaint   Patient presents with     Musculoskeletal Problem     Hamstring muscle strain 2/7/2018 he was taekwon-do when he hurt his leg. Pain stiffness, tightness on the leg. Tx: acupuncture, cupping,ice and hot tub, swimming pool and IBU       Initial /84 (BP Location: Right arm, Patient Position: Sitting, Cuff Size: Adult Large)  Pulse 64  Resp 14  Ht 1.87 m (6' 1.62\")  Wt 113.4 kg (250 lb)  BMI 32.43 kg/m2 Estimated body mass index is 32.43 kg/(m^2) as calculated from the following:    Height as of this encounter: 1.87 m (6' 1.62\").    Weight as of this encounter: 113.4 kg (250 lb).  Medication Reconciliation: complete   Arlyn Duffy MA      "

## 2018-02-20 NOTE — PATIENT INSTRUCTIONS
Please remember to call and schedule a follow up appointment if one was recommended at your earliest convenience.   Orthopedics CLINIC HOURS TELEPHONE NUMBER   Fish Ramos M.D.      Arlyn Duffy  Medical Assistant Tuesday 8 am -5 pm    Wednesday 8 am -1 pm    Thursday 8 am -5 pm   Specialty schedulers:   [phone] to make an appointment with any Specialty Provider.   Main Clinic:   [phone] to make an appointment with your primary provider   Urgent Care locations:    [facility] Monday-Friday 5 pm - 9 pm  Saturday-Sunday 9 am -5pm      Monday-Friday 11 am - 9 pm  Saturday 9 am - 5 pm [phone]     If SURGERY has been recommended, please call our Specialty Schedulers at [phone] to schedule your procedure.    If you need a medication refill, please contact your pharmacy. Please allow 3 business days for your refill to be completed.  Use [employer] (secure e-mail communication and access to your chart) to send a message or to make an appointment. Please ask how you can sign up for [employer].

## 2018-02-20 NOTE — MR AVS SNAPSHOT
After Visit Summary   2/20/2018    Jorge Rivas    MRN: [id number]           Patient Information     Date Of Birth          1965        Visit Information        Provider Department      2/20/2018 3:30 PM Fish Ramos MD [facility]        Today's Diagnoses     Tear of left hamstring, initial encounter    -  1      Care Instructions    Please remember to call and schedule a follow up appointment if one was recommended at your earliest convenience.   Orthopedics CLINIC HOURS TELEPHONE NUMBER   Fish Ramos M.D.      Arlyn Duffy  Medical Assistant Tuesday 8 am -5 pm    Wednesday 8 am -1 pm    Thursday 8 am -5 pm   Specialty schedulers:   [phone] to make an appointment with any Specialty Provider.   Main Clinic:   [phone] to make an appointment with your primary provider   Urgent Care locations:    [facility] Monday-Friday 5 pm - 9 pm  Saturday-Sunday 9 am -5pm      Monday-Friday 11 am - 9 pm  Saturday 9 am - 5 pm [phone]     If SURGERY has been recommended, please call our Specialty Schedulers at [phone] to schedule your procedure.    If you need a medication refill, please contact your pharmacy. Please allow 3 business days for your refill to be completed.  Use [employer] Messenger (secure e-mail communication and access to your chart) to send a message or to make an appointment. Please ask how you can sign up for Task Messenger.          Follow-ups after your visit        Additional Services     OBI PT, HAND, AND CHIROPRACTIC REFERRAL       Your provider has referred you to: Physical Therapy    Indication/Reason for Referral:   Onset of Illness:  2 weeks   Therapy Orders:  Physical Therapy at [facility] or [facility] Evaluate and Treat  Modalities:  As Indicated:   Equipment: As Indicated:   Special Request: None    Additional Comments:  # of visits per therapist's discretion                  Who to contact     If you have  "questions or need follow up information about today's clinic visit or your schedule please contact [facility] directly at [phone].  Normal or non-critical lab and imaging results will be communicated to you by MyChart, letter or phone within 4 business days after the clinic has received the results. If you do not hear from us within 7 days, please contact the clinic through Praccelhart or phone. If you have a critical or abnormal lab result, we will notify you by phone as soon as possible.  Submit refill requests through Peerflix or call your pharmacy and they will forward the refill request to us. Please allow 3 business days for your refill to be completed.          Additional Information About Your Visit        [employer] Information     Peerflix lets you send messages to your doctor, view your test results, renew your prescriptions, schedule appointments and more. To sign up, go to [email or web address] . Click on \"Log in\" on the left side of the screen, which will take you to the Welcome page. Then click on \"Sign up Now\" on the right side of the page.     You will be asked to enter the access code listed below, as well as some personal information. Please follow the directions to create your username and password.     Your access code is: [email or web address]  Expires: 2018 10:05 AM     Your access code will  in 90 days. If you need help or a new code, please call your [facility] clinic or [phone].        Care EveryWhere ID     This is your Care EveryWhere ID. This could be used by other organizations to access your [facility] medical records  [id number]        Your Vitals Were     Pulse Respirations Height BMI (Body Mass Index)          64 14 1.87 m (6' 1.62\") 32.43 kg/m2         Blood Pressure from Last 3 Encounters:   18 138/84   18 114/73   17 115/72    Weight from Last 3 Encounters:   18 113.4 kg (250 lb)   18 113.4 kg (250 lb)   17 113.4 kg (250 lb)    "           We Performed the Following     OBI PT, HAND, AND CHIROPRACTIC REFERRAL        Primary Care Provider Office Phone # Fax #    Jaydon Mead [phone]       [address] [facility] [address]        Equal Access to Services     NORA TONNY : Hadii nia ku hadfrancheskao Soomaali, waaxda luqadaha, qaybta kaalmada adeegyada, moses jay haybernicen ashlee arboleda laJaimemagui . So [facility] [phone].    ATENCIÓN: Si habla español, tiene a espino disposición servicios gratuitos de asistencia lingüística. Llame al [phone].    We comply with applicable federal civil rights laws and [place] laws. We do not discriminate on the basis of race, color, national origin, age, disability, sex, sexual orientation, or gender identity.            Thank you!     Thank you for choosing [facility]  for your care. Our goal is always to provide you with excellent care. Hearing back from our patients is one way we can continue to improve our services. Please take a few minutes to complete the written survey that you may receive in the mail after your visit with us. Thank you!             Your Updated Medication List - Protect others around you: Learn how to safely use, store and throw away your medicines at www.disposemymeds.org.          This list is accurate as of 2/20/18  6:13 PM.  Always use your most recent med list.                   Brand Name Dispense Instructions for use Diagnosis    aspirin 81 MG tablet      Take by mouth daily        DAILY MULTIVITAMIN PO           fluocinonide 0.05 % cream    LIDEX    30 g    Apply sparingly to affected area twice daily for 14 days.  Do not apply to face.    Eczema       sildenafil 100 MG tablet    VIAGRA    6 tablet    Take 0.5-1 tablets ( mg) by mouth daily as needed for erectile dysfunction Take 30 min to 4 hours before intercourse.  Never use with nitroglycerin, terazosin or doxazosin.    ED (erectile dysfunction)

## 2018-02-20 NOTE — PROGRESS NOTES
Jorge Rivas is 52 year old male who is seen in consultation at the request of Dr. Venessa Swift for right lower extremity injury that occurred on 02/07/18. It has been approximately 2 weeks. The patient was trying to perform a forward kick and felt a cramp of the distal hamstring. He was unable to continue the activity but was able to ambulate. The patient saw Dr. Swift after he tried icing and cupping who recommended heating, stretching, and if symptoms worsen, physical therapy.     Present symptoms: He points ot the area of the mid hamstring where a majority of his pain was. He also has pain of his IT band and calf lately.  Treatments up to this point: hot tub, swimming pool, icing, heating, cupping, acupuncture, elevating and NSAIDs    PAST MEDICAL HISTORY:   Past Medical History:   Diagnosis Date     History of MI (myocardial infarction)     [facility]     PAST SURGICAL HISTORY:  Orthopedic surgery (ACL R Knee;Tendon left ankle).  Past Surgical History:   Procedure Laterality Date     C REPAIR CRUCIATE LIGAMENT,KNEE  late 90s    R knee     FAMILY HISTORY:   Family History   Problem Relation Age of Onset     DIABETES Mother      Cardiovascular Mother      Prostate Cancer Paternal Uncle      SOCIAL HISTORY:   Social History   Substance Use Topics     Smoking status: Former Smoker     Quit date: 1/1/2006     Smokeless tobacco: Never Used     Alcohol use 0.0 oz/week     0 Standard drinks or equivalent per week      Comment: variable     REVIEW OF SYSTEMS:  CONSTITUTIONAL:  NEGATIVE for fever, chills, change in weight  INTEGUMENTARY/SKIN:  NEGATIVE for worrisome rashes, moles or lesions  EYES:  NEGATIVE for vision changes or irritation  ENT/MOUTH:  NEGATIVE for ear, mouth and throat problems  RESP:  NEGATIVE for significant cough or SOB  BREAST:  NEGATIVE for masses, tenderness or discharge  CV:  NEGATIVE for chest pain, palpitations or peripheral edema  GI:  NEGATIVE for nausea,  "abdominal pain, heartburn, or change in bowel habits  :  NEGATIVE  MUSCULOSKELETAL:  See HPI above  NEURO:  NEGATIVE for weakness, dizziness or paresthesias  ENDOCRINE:  NEGATIVE for temperature intolerance, skin/hair changes  HEME/ALLERGY/IMMUNE:  NEGATIVE for bleeding problems  PSYCHIATRIC:  NEGATIVE for changes in mood or affect    PHYSICAL EXAM:  /84  Resp 14  Ht 1.87 m (6' 1.62\")  Wt 113.4 kg (250 lb)  BMI 32.43 kg/m2  GENERAL APPEARANCE: healthy, alert and no distress   SKIN: no suspicious lesions or rashes  NEURO: Normal strength and tone, mentation intact and speech normal  VASCULAR: good pulses, and cappillary refill   LYMPH: no lymphadenopathy   PSYCH:  mentation appears normal and affect normal/bright  RESP: no increased work of breathing    RIGHT LOWER EXTREMITY PAIN:  Gait: Walks with a normal gait  Inspection: Extensive ecchymosis of posterior thigh extending up to the buttock and down into the calf. There is some swelling of the calf and the posterior thigh  Palpation:The calf is soft and non-tender. I can feel one of the medial hamstrings intact. Semitendinosus is difficult to palpate. I don't feel any gaps in the hamstring.  Non-tender: Ischial tuberosity although he just had pain start in that area.  Tender: Along lateral hamstring.  Effusion: Fullness in the mid-aspect of the lateral hamstring  ROM: Even with knee and hip flexed at 90 degrees, he has pain on the medial side of the hamstrings.. Extension to 70 degrees is too painful for him to bear.     Impression:   Medial hamstring tear, right side    Plan:  I recommended the patient start gentle stretching and strengthening exercises in 1-2 weeks from now. I ordered PT to aid in the process. The patient can proceed with heat and cold therapies as needed. Tubigrip provided and wrapping and NSAIDs as needed over the next month. All questions were answered.    Return to clinic: MÓNICA Ramos MD  Dept. Orthopedic " Surgery  [facility]    This document serves as a record of the services and decisions personally performed and made by Dr. CECIL Ramos MD. It was created on his behalf by Bonilla Jensen, a trained medical scribe. The creation of this record is based on the provider's personal observations and the statements of the patient. This document has been checked and approved by the attending provider.   Bonilla Jensen February 20, 2018 4:12 PM

## 2018-08-20 ENCOUNTER — TELEPHONE (OUTPATIENT)
Dept: FAMILY MEDICINE | Facility: CLINIC | Age: 53
End: 2018-08-20

## 2018-08-20 NOTE — TELEPHONE ENCOUNTER
Patient was stung by a wasp or bee last Thursday on right arm.  Some redness and swelling at that time.  Stung again by two wasps or bees on Saturday in his same arm.  Forearm and hand are swollen.  Some redness on forearm as well.  Warm to the touch. Denies anaphylactic symptoms.  He has used baking soda toothpaste on bite areas and Loratadine. Which has helped.  Swelling in hand is still the same.  Not much changes since bites occurred.  Not worse but not better.  Denies infection symptoms.  Home remedies give but also recommended office visit for evaluation.  Patient agrees and appt scheduled.    Sada Laguna RN

## 2018-08-20 NOTE — TELEPHONE ENCOUNTER
Reason for call:  Patient reporting a symptom    Symptom or request: Bee stings / Swelling in arm    Duration (how long have symptoms been present): 2-3 days    Have you been treated for this before? No    Additional comments: Patient stated last week he was stung by a bee on his wrist. A few days later he had a few more bee stings in his arm. He stated his arm is swollen and he is wondering if he should be seen for this or let time pass to see if it goes down. Patient stated he has been taking some allergy medication but he still has swelling. Please call back to discuss.    Phone Number patient can be reached at:  Home number on file [phone] (home)    Best Time:  Anytime    Can we leave a detailed message on this number:  YES    Call taken on 8/20/2018 at 3:04 PM by Manda Schuler

## 2018-08-21 ENCOUNTER — OFFICE VISIT (OUTPATIENT)
Dept: FAMILY MEDICINE | Facility: CLINIC | Age: 53
End: 2018-08-21
Payer: COMMERCIAL

## 2018-08-21 VITALS
OXYGEN SATURATION: 96 % | WEIGHT: 246 LBS | BODY MASS INDEX: 31.91 KG/M2 | HEART RATE: 96 BPM | SYSTOLIC BLOOD PRESSURE: 124 MMHG | DIASTOLIC BLOOD PRESSURE: 76 MMHG | TEMPERATURE: 97.3 F

## 2018-08-21 DIAGNOSIS — T63.464A WASP STING, UNDETERMINED INTENT, INITIAL ENCOUNTER: Primary | ICD-10-CM

## 2018-08-21 DIAGNOSIS — M25.572 LEFT ANKLE PAIN, UNSPECIFIED CHRONICITY: ICD-10-CM

## 2018-08-21 PROCEDURE — 99213 OFFICE O/P EST LOW 20 MIN: CPT | Performed by: NURSE PRACTITIONER

## 2018-08-21 RX ORDER — METHYLPREDNISOLONE 4 MG
TABLET, DOSE PACK ORAL
Qty: 21 TABLET | Refills: 0 | Status: SHIPPED | OUTPATIENT
Start: 2018-08-21 | End: 2020-11-11

## 2018-08-21 ASSESSMENT — PAIN SCALES - GENERAL: PAINLEVEL: NO PAIN (0)

## 2018-08-21 NOTE — MR AVS SNAPSHOT
After Visit Summary   8/21/2018    Jorge Rivas    MRN: [id number]           Patient Information     Date Of Birth          1965        Visit Information        Provider Department      8/21/2018 3:00 PM Ragini Bridges APRN CNP [facility]        Today's Diagnoses     Wasp sting, undetermined intent, initial encounter    -  1    Left ankle pain, unspecified chronicity          Care Instructions    Take Medrol DosePak as prescribed  Continue taking Loratadine. You can take up to twice daily    You have an appointment with our podiatrist, Dr. Peace at 3 pm on Friday          Follow-ups after your visit        Additional Services     ORTHO  REFERRAL       [facility] is referring you to the Orthopedic  Services at [facility].       The  Representative will assist you in the coordination of your Orthopedic and Musculoskeletal Care as prescribed by your physician.    The  Representative will call you within 1 business day to help schedule your appointment, or you may contact the  Representative at:    All areas ~ [phone]     Type of Referral : Non Surgical       Timeframe requested: Routine    Coverage of these services is subject to the terms and limitations of your health insurance plan.  Please call member services at your health plan with any benefit or coverage questions.      If X-rays, CT or MRI's have been performed, please contact the facility where they were done to arrange for , prior to your scheduled appointment.  Please bring this referral request to your appointment and present it to your specialist.                  Your next 10 appointments already scheduled     Aug 24, 2018  3:00 PM CDT   New Visit with Remigio Peace DPM   [facility] ([facility])    [address] [place] [address]  "[place] [id number] [phone]              Who to contact     If you have questions or need follow up information about today's clinic visit or your schedule please contact [facility] directly at [phone].  Normal or non-critical lab and imaging results will be communicated to you by MyChart, letter or phone within 4 business days after the clinic has received the results. If you do not hear from us within 7 days, please contact the clinic through MyChart or phone. If you have a critical or abnormal lab result, we will notify you by phone as soon as possible.  Submit refill requests through PENRITH or call your pharmacy and they will forward the refill request to us. Please allow 3 business days for your refill to be completed.          Additional Information About Your Visit        [employer] Information     PENRITH lets you send messages to your doctor, view your test results, renew your prescriptions, schedule appointments and more. To sign up, go to [email or web address] . Click on \"Log in\" on the left side of the screen, which will take you to the Welcome page. Then click on \"Sign up Now\" on the right side of the page.     You will be asked to enter the access code listed below, as well as some personal information. Please follow the directions to create your username and password.     Your access code is: [email or web address]  Expires: 2018  3:23 PM     Your access code will  in 90 days. If you need help or a new code, please call your [facility] or [phone].        Care EveryWhere ID     This is your Care EveryWhere ID. This could be used by other organizations to access your [place] medical records  [id number]        Your Vitals Were     Pulse Temperature Pulse Oximetry BMI (Body Mass Index)          96 97.3  F (36.3  C) (Oral) 96% 31.91 kg/m2         Blood Pressure from Last 3 Encounters:   18 124/76   18 138/84   18 114/73    Weight from Last 3 " Encounters:   08/21/18 246 lb (111.6 kg)   02/20/18 250 lb (113.4 kg)   02/13/18 250 lb (113.4 kg)              We Performed the Following     ORTHO  REFERRAL          Today's Medication Changes          These changes are accurate as of 8/21/18  3:23 PM.  If you have any questions, ask your nurse or doctor.               Start taking these medicines.        Dose/Directions    methylPREDNISolone 4 MG tablet   Commonly known as:  MEDROL DOSEPAK   Used for:  Wasp sting, undetermined intent, initial encounter   Started by:  Ragini Bridges APRN CNP        Follow package instructions   Quantity:  21 tablet   Refills:  0            Where to get your medicines      These medications were sent to [facility] [address] [facility] [address] [facility] [address] [facility] [address] [facility]  [address] [facility] [address]     Phone:  [phone]     methylPREDNISolone 4 MG tablet                Primary Care Provider Office Phone # Fax #    Jaydon Mead [phone] [address] [facility] [address]        Equal Access to Services     [facility] Bern[facility] campusASAD : Hadii aad ku hadasho Soomaali, waaxda luqadaha, qaybta kaalmada adeegyajeremiah, moses berger . So [facility] [phone].    ATENCIÓN: Si habla español, tiene a espino disposición servicios gratuitos de asistencia lingüística. Annia al [phone].    We comply with applicable federal civil rights laws and [place] laws. We do not discriminate on the basis of race, color, national origin, age, disability, sex, sexual orientation, or gender identity.            Thank you!     Thank you for choosing [facility]  for your care. Our goal is always to provide you with excellent care. Hearing back from our patients is one way we can continue to improve our services. Please take a few minutes to complete the written survey that you may receive in the mail after your visit with us.  Thank you!             Your Updated Medication List - Protect others around you: Learn how to safely use, store and throw away your medicines at www.disposemymeds.org.          This list is accurate as of 8/21/18  3:23 PM.  Always use your most recent med list.                   Brand Name Dispense Instructions for use Diagnosis    aspirin 81 MG tablet      Take by mouth daily        DAILY MULTIVITAMIN PO           fluocinonide 0.05 % cream    LIDEX    30 g    Apply sparingly to affected area twice daily for 14 days.  Do not apply to face.    Eczema       LORATADINE PO           methylPREDNISolone 4 MG tablet    MEDROL DOSEPAK    21 tablet    Follow package instructions    Wasp sting, undetermined intent, initial encounter       sildenafil 100 MG tablet    VIAGRA    6 tablet    Take 0.5-1 tablets ( mg) by mouth daily as needed for erectile dysfunction Take 30 min to 4 hours before intercourse.  Never use with nitroglycerin, terazosin or doxazosin.    ED (erectile dysfunction)

## 2018-08-21 NOTE — PROGRESS NOTES
SUBJECTIVE:   Jorge Rivas is a 52 year old male who presents to clinic today for the following health issues:    Bee Sting      Duration: 2 weeks ago, and then 3 days ago again    Description (location/character/radiation): right lower arm    Intensity:  mild    Accompanying signs and symptoms: Swollen, redness, itching and tightness feeling    History (similar episodes/previous evaluation): None     Precipitating or alleviating factors: None    Therapies tried and outcome: Loratadine and Ibuprofen- cannot tell if this is helping, baking soda and toothpaste, witch hazel, epsom soak     3 bee or wasps stings within 1 week to right forearm  Swollen, painful  Taking loratadine once daily  Denies SOB, wheezing, facial, lip swelling, chest tightness  No known allergies    He saw KAYCEE Hartman 10/13/16 for left ankle pain 6 months after ankle sprain  Recommended physical therapy which has not been done  Initially sprained during yoga  Intermittent flares of pain  A few times a month  Does not take anything for pain      Problem list and histories reviewed & adjusted, as indicated.  Additional history: none    Patient Active Problem List   Diagnosis     CAD (coronary artery disease)     CARDIOVASCULAR SCREENING; LDL GOAL LESS THAN 100     Erectile dysfunction, unspecified erectile dysfunction type     Past Surgical History:   Procedure Laterality Date     C REPAIR CRUCIATE LIGAMENT,KNEE  late 90s    R knee       Social History   Substance Use Topics     Smoking status: Former Smoker     Quit date: 1/1/2006     Smokeless tobacco: Never Used     Alcohol use 0.0 oz/week     0 Standard drinks or equivalent per week      Comment: variable     Family History   Problem Relation Age of Onset     Diabetes Mother      Cardiovascular Mother      Prostate Cancer Paternal Uncle            Reviewed and updated as needed this visit by clinical staff  Allergies  Meds       Reviewed and updated as needed this visit by  Provider         ROS:  Constitutional, HEENT, cardiovascular, pulmonary, gi and gu systems are negative, except as otherwise noted.    OBJECTIVE:     /76 (BP Location: Right arm, Patient Position: Chair, Cuff Size: Adult Large)  Pulse 96  Temp 97.3  F (36.3  C) (Oral)  Wt 246 lb (111.6 kg)  SpO2 96%  BMI 31.91 kg/m2  Body mass index is 31.91 kg/(m^2).  GENERAL: healthy, alert and no distress  RESP: lungs clear to auscultation - no rales, rhonchi or wheezes  CV: regular rate and rhythm, normal S1 S2, no S3 or S4, no murmur, click or rub, no peripheral edema and peripheral pulses strong  MS: Gait intact. Plantar/dorsiflexion 5/5. No tenderness lateral or medial malleolus. Reports tenderness posterior and inferior to medial malleolus though unable to reproduce on exam  SKIN: 3 erythematous papules right posterior forearm with surrounding erythema, slight swelling, minimal warmth. Skin intact  NEURO: Normal strength and tone, mentation intact and speech normal    Diagnostic Test Results:  none     ASSESSMENT/PLAN:       ICD-10-CM    1. Wasp sting, undetermined intent, initial encounter T63.464A methylPREDNISolone (MEDROL DOSEPAK) 4 MG tablet   2. Left ankle pain, unspecified chronicity M25.572 ORTHO  REFERRAL       Large local inflammatory response, worsened with 3 separate stings though no signs of infection. Advised that infection is rare with wasp or bee stings. Will treat with steroid burst  Recommend podiatry consult given chronic ankle pain after fracture more than 1 year ago. Will defer imaging to podiatry    ELVIS Youssef [facility]

## 2018-08-21 NOTE — PATIENT INSTRUCTIONS
Take Medrol DosePak as prescribed  Continue taking Loratadine. You can take up to twice daily    You have an appointment with our podiatrist, Dr. Peace at 3 pm on Friday

## 2018-08-24 ENCOUNTER — OFFICE VISIT (OUTPATIENT)
Dept: PODIATRY | Facility: CLINIC | Age: 53
End: 2018-08-24
Payer: COMMERCIAL

## 2018-08-24 ENCOUNTER — RADIANT APPOINTMENT (OUTPATIENT)
Dept: GENERAL RADIOLOGY | Facility: CLINIC | Age: 53
End: 2018-08-24
Attending: PODIATRIST
Payer: COMMERCIAL

## 2018-08-24 VITALS
HEART RATE: 71 BPM | WEIGHT: 245 LBS | SYSTOLIC BLOOD PRESSURE: 130 MMHG | HEIGHT: 74 IN | BODY MASS INDEX: 31.44 KG/M2 | DIASTOLIC BLOOD PRESSURE: 78 MMHG | OXYGEN SATURATION: 99 %

## 2018-08-24 DIAGNOSIS — M21.6X2 ACQUIRED PRONATION DEFORMITY OF LEFT ANKLE: Primary | ICD-10-CM

## 2018-08-24 DIAGNOSIS — M76.822 POSTERIOR TIBIAL TENDINITIS OF LEFT LOWER EXTREMITY: ICD-10-CM

## 2018-08-24 DIAGNOSIS — M21.6X2 ACQUIRED PRONATION DEFORMITY OF LEFT ANKLE: ICD-10-CM

## 2018-08-24 PROCEDURE — 99203 OFFICE O/P NEW LOW 30 MIN: CPT | Performed by: PODIATRIST

## 2018-08-24 PROCEDURE — 73630 X-RAY EXAM OF FOOT: CPT | Mod: LT

## 2018-08-24 ASSESSMENT — PAIN SCALES - GENERAL: PAINLEVEL: MILD PAIN (2)

## 2018-08-24 NOTE — PATIENT INSTRUCTIONS
Weight management plan: Patient was referred to their PCP to discuss a diet and exercise plan.     We wish you continued good healing. If you have any questions or concerns, please do not hesitate to contact us at [phone]    Please remember to call and schedule a follow up appointment if one was recommended at your earliest convenience.   PODIATRY CLINIC HOURS  TELEPHONE NUMBER    Dr. Remigio Peace D.P.M [facility]    Clinics:  [facility]    [facility]anda Donaldson [facility]   Tuesday 1PM-6PM  [place]  Wednesday 7AM-2PM  [facility]  Thursday 10AM-6PM  [place]  Friday 7AM-3PM  [place]  Specialty schedulers:   [phone] to make an appointment with any Specialty Provider.        Urgent Care locations:    [facility] Monday-Friday 5 pm - 9 pm. Saturday-Sunday 9 am -5pm    Monday-Friday 11 am - 9 pm Saturday 9 am - 5 pm     Monday-Sunday 12 noon-8PM [phone]     If you need a medication refill, please contact us you may need lab work and/or a follow up visit prior to your refill (i.e. Antifungal medications).    [employer] (secure e-mail communication and access to your chart) to send a message or to make an appointment.    If MRI needed please call [facility] at [phone]

## 2018-08-24 NOTE — MR AVS SNAPSHOT
After Visit Summary   8/24/2018    Jorge Rivas    MRN: [id number]           Patient Information     Date Of Birth          1965        Visit Information        Provider Department      8/24/2018 12:45 PM Remigio Peace, DPKELLEY [facility]        Today's Diagnoses     Acquired pronation deformity of left ankle    -  1    Posterior tibial tendinitis of left lower extremity          Care Instructions    Weight management plan: Patient was referred to their PCP to discuss a diet and exercise plan.     We wish you continued good healing. If you have any questions or concerns, please do not hesitate to contact us at [phone]    Please remember to call and schedule a follow up appointment if one was recommended at your earliest convenience.   PODIATRY CLINIC HOURS  TELEPHONE NUMBER    Dr. Remigio RODRIGUEZPDAIJA [facility]    Clinics:  [facility]    [facility]anda Donaldson [facility]   Tuesday 1PM-6PM  [place]  Wednesday 7AM-2PM  [place]  Thursday 10AM-6PM  [place]  Friday 7AM-3PM  [place]  Specialty schedulers:   [phone] to make an appointment with any Specialty Provider.        Urgent Care locations:    [facility] Monday-Friday 5 pm - 9 pm. Saturday-Sunday 9 am -5pm    Monday-Friday 11 am - 9 pm Saturday 9 am - 5 pm     Monday-Sunday 12 noon-8PM [phone]     If you need a medication refill, please contact us you may need lab work and/or a follow up visit prior to your refill (i.e. Antifungal medications).    [employer] (secure e-mail communication and access to your chart) to send a message or to make an appointment.    If MRI needed please call [facility] at [phone]                    Follow-ups after your visit        Who to contact     If you have questions or need follow up information about today's clinic visit or  "your schedule please contact [facility] directly at [phone].  Normal or non-critical lab and imaging results will be communicated to you by MyChart, letter or phone within 4 business days after the clinic has received the results. If you do not hear from us within 7 days, please contact the clinic through MyChart or phone. If you have a critical or abnormal lab result, we will notify you by phone as soon as possible.  Submit refill requests through [employer] or call your pharmacy and they will forward the refill request to us. Please allow 3 business days for your refill to be completed.          Additional Information About Your Visit        [employer] Information     ZenMate lets you send messages to your doctor, view your test results, renew your prescriptions, schedule appointments and more. To sign up, go to [email or web address] . Click on \"Log in\" on the left side of the screen, which will take you to the Welcome page. Then click on \"Sign up Now\" on the right side of the page.     You will be asked to enter the access code listed below, as well as some personal information. Please follow the directions to create your username and password.     Your access code is: [email or web address]  Expires: 2018  3:23 PM     Your access code will  in 90 days. If you need help or a new code, please call your [place] clinic or [phone].        Care EveryWhere ID     This is your Care EveryWhere ID. This could be used by other organizations to access your [place] medical records  [id number]        Your Vitals Were     Pulse Height Pulse Oximetry BMI (Body Mass Index)          71 6' 2\" (1.88 m) 99% 31.46 kg/m2         Blood Pressure from Last 3 Encounters:   18 130/78   18 124/76   18 138/84    Weight from Last 3 Encounters:   18 245 lb (111.1 kg)   18 246 lb (111.6 kg)   18 250 lb (113.4 kg)               Primary Care Provider Office Phone # Fax #    " Jaydon Mead [phone]       [address] [facility] [address]        Equal Access to Services     NORA [place] : Hadii aad ku hadfrancheskaabe Larose, waradha wall, salbadoranibal wongjacquelinejeremiah dela cruzashleejeremiah, moses barahona jose de jesusabe arboleda laJaimemagui barahona. So [facility] [phone].    ATENCIÓN: Si habla español, tiene a espino disposición servicios gratuitos de asistencia lingüística. Llame al [phone].    We comply with applicable federal civil rights laws and [place] laws. We do not discriminate on the basis of race, color, national origin, age, disability, sex, sexual orientation, or gender identity.            Thank you!     Thank you for choosing [facility]  for your care. Our goal is always to provide you with excellent care. Hearing back from our patients is one way we can continue to improve our services. Please take a few minutes to complete the written survey that you may receive in the mail after your visit with us. Thank you!             Your Updated Medication List - Protect others around you: Learn how to safely use, store and throw away your medicines at www.disposemymeds.org.          This list is accurate as of 8/24/18  2:42 PM.  Always use your most recent med list.                   Brand Name Dispense Instructions for use Diagnosis    aspirin 81 MG tablet      Take by mouth daily        DAILY MULTIVITAMIN PO           fluocinonide 0.05 % cream    LIDEX    30 g    Apply sparingly to affected area twice daily for 14 days.  Do not apply to face.    Eczema       LORATADINE PO           methylPREDNISolone 4 MG tablet    MEDROL DOSEPAK    21 tablet    Follow package instructions    Wasp sting, undetermined intent, initial encounter       sildenafil 100 MG tablet    VIAGRA    6 tablet    Take 0.5-1 tablets ( mg) by mouth daily as needed for erectile dysfunction Take 30 min to 4 hours before intercourse.  Never use with nitroglycerin, terazosin or doxazosin.    ED  (erectile dysfunction)

## 2018-08-24 NOTE — LETTER
8/24/2018         RE: Jorge Rivas  [address] [facility] [address]        Dear Colleague,    Thank you for referring your patient, Jorge Rivas, to the [facility]. Please see a copy of my visit note below.    S: Patient seen in consult from Ragini Bridges and complains of left foot pain.  Points to posterior tibial tendon where is courses around the medial malleoli.  Has had this for 1 years.  Describes it as a burning pain.  Aggrevated by activity and relieved by rest.  Pain intermittent.  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  Wearing mostly sandals.    denies weakness.  denies drop in arch.  deniesedema.  denies ecchymosis.    ROS:  A 10-point review of systems was performed and is positive for that noted in the HPI and as seen below.  All other areas are negative.        Allergies   Allergen Reactions     Benadryl [Diphenhydramine]      Sinuses get backed up, watering of the eyes     Cats      Grass      Metamucil [Psyllium]      Rash       Current Outpatient Prescriptions   Medication Sig Dispense Refill     aspirin 81 MG tablet Take by mouth daily       fluocinonide (LIDEX) 0.05 % cream Apply sparingly to affected area twice daily for 14 days.  Do not apply to face. 30 g 0     LORATADINE PO        methylPREDNISolone (MEDROL DOSEPAK) 4 MG tablet Follow package instructions 21 tablet 0     Multiple Vitamin (DAILY MULTIVITAMIN PO)        sildenafil (VIAGRA) 100 MG tablet Take 0.5-1 tablets ( mg) by mouth daily as needed for erectile dysfunction Take 30 min to 4 hours before intercourse.  Never use with nitroglycerin, terazosin or doxazosin. 6 tablet 1       Patient Active Problem List   Diagnosis     CAD (coronary artery disease)     CARDIOVASCULAR SCREENING; LDL GOAL LESS THAN 100     Erectile dysfunction, unspecified erectile dysfunction type       Past Medical History:   Diagnosis Date     History of MI (myocardial infarction)  "    [facility]       Past Surgical History:   Procedure Laterality Date     C REPAIR CRUCIATE LIGAMENT,KNEE  late 90s    R knee       Family History   Problem Relation Age of Onset     Diabetes Mother      Cardiovascular Mother      Prostate Cancer Paternal Uncle        Social History   Substance Use Topics     Smoking status: Former Smoker     Quit date: 1/1/2006     Smokeless tobacco: Never Used     Alcohol use 0.0 oz/week     0 Standard drinks or equivalent per week      Comment: variable         Exam:  Vitals: /78 (BP Location: Right arm, Patient Position: Sitting, Cuff Size: Adult Regular)  Pulse 71  Ht 6' 2\" (1.88 m)  Wt 245 lb (111.1 kg)  SpO2 99%  BMI 31.46 kg/m2  BMI: Body mass index is 31.46 kg/(m^2).  Height: 6' 2\"    Constitutional/ general:  Pt is in no apparent distress, appears well-nourished.  Cooperative with history and physical exam.     Psych:  The patient answered questions appropriately.  Normal affect.  Seems to have reasonable expectations, in terms of treatment.     Eyes:  Visual scanning/ tracking without deficit.     Ears:  Response to auditory stimuli is normal.  No  hearing aid devices.  Auricles in proper alignment.     Lymphatic:  Popliteal lymph nodes not enlarged.     Lungs:  Non labored breathing, non labored speech. No cough.  No audible wheezing. Even, quiet breathing.       Vascular:  Pedal pulses are palpable bilaterally for both the DP and PT arteries.  CFT < 3 sec.  No edema.  No varicosities.  Pedal hair growth noted.     Neuro:  Alert and oriented x 3. Coordinated gait.  Light touch sensation is intact to the L4, L5, S1 distributions. No obvious deficits.  No evidence of neurological-based weakness, spasticity, or contracture in the lower extremities.    Derm: Normal texture and turgor.  No erythema, ecchymosis, or cyanosis.  No open lesions.     Musculoskeletal:    Lower extremity muscle strength is normal.  Patient is ambulatory without an assistive " device or brace.  No gross deformities.  Normal ROM all fore foot and rearfoot joints.  No equinus.  With weightbearing patient has bilateral pronation.  No pain with ROM.   Pain with palpation posterior tibial tendon and spring ligament left.  negative pain with stressing posterior tibial tendon.  Good calcaneal iversion with foot flexion.  negative erythema.  negative edema.  negative ecchymosis.   negative masses noted.      Radiographic:  X-rays normal.    A:  Pronation and posterior tibial tendonitis    P:  X-rays taken today.  Discussed the cause of this with the patient.  Patient has ankle brace and he states this has helped him.  He will continue wearing this.   ice bid.  Minimize activities until healed.  Explained must have good support for feet at all times or could develop tear in tendon and may need surgery.    Discussed importance of wearing these in a good shoe at all times to prevent future problems.  Wrote prescription for orthotics.  RETURN TO CLINIC PRN.  Thank you for allowing me participate in the care of this patient.        Remigio Peace DPM, FACFAS           Again, thank you for allowing me to participate in the care of your patient.        Sincerely,        Remigio Peace DPM

## 2018-08-24 NOTE — PROGRESS NOTES
S: Patient seen in consult from Ragini Bridges and complains of left foot pain.  Points to posterior tibial tendon where is courses around the medial malleoli.  Has had this for 1 years.  Describes it as a burning pain.  Aggrevated by activity and relieved by rest.  Pain intermittent.  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  Wearing mostly sandals.    denies weakness.  denies drop in arch.  deniesedema.  denies ecchymosis.    ROS:  A 10-point review of systems was performed and is positive for that noted in the HPI and as seen below.  All other areas are negative.        Allergies   Allergen Reactions     Benadryl [Diphenhydramine]      Sinuses get backed up, watering of the eyes     Cats      Grass      Metamucil [Psyllium]      Rash       Current Outpatient Prescriptions   Medication Sig Dispense Refill     aspirin 81 MG tablet Take by mouth daily       fluocinonide (LIDEX) 0.05 % cream Apply sparingly to affected area twice daily for 14 days.  Do not apply to face. 30 g 0     LORATADINE PO        methylPREDNISolone (MEDROL DOSEPAK) 4 MG tablet Follow package instructions 21 tablet 0     Multiple Vitamin (DAILY MULTIVITAMIN PO)        sildenafil (VIAGRA) 100 MG tablet Take 0.5-1 tablets ( mg) by mouth daily as needed for erectile dysfunction Take 30 min to 4 hours before intercourse.  Never use with nitroglycerin, terazosin or doxazosin. 6 tablet 1       Patient Active Problem List   Diagnosis     CAD (coronary artery disease)     CARDIOVASCULAR SCREENING; LDL GOAL LESS THAN 100     Erectile dysfunction, unspecified erectile dysfunction type       Past Medical History:   Diagnosis Date     History of MI (myocardial infarction)     [facility]       Past Surgical History:   Procedure Laterality Date     C REPAIR CRUCIATE LIGAMENT,KNEE  late 90s    R knee       Family History   Problem Relation Age of Onset     Diabetes Mother      Cardiovascular Mother      Prostate Cancer Paternal Uncle  "       Social History   Substance Use Topics     Smoking status: Former Smoker     Quit date: 1/1/2006     Smokeless tobacco: Never Used     Alcohol use 0.0 oz/week     0 Standard drinks or equivalent per week      Comment: variable         Exam:  Vitals: /78 (BP Location: Right arm, Patient Position: Sitting, Cuff Size: Adult Regular)  Pulse 71  Ht 6' 2\" (1.88 m)  Wt 245 lb (111.1 kg)  SpO2 99%  BMI 31.46 kg/m2  BMI: Body mass index is 31.46 kg/(m^2).  Height: 6' 2\"    Constitutional/ general:  Pt is in no apparent distress, appears well-nourished.  Cooperative with history and physical exam.     Psych:  The patient answered questions appropriately.  Normal affect.  Seems to have reasonable expectations, in terms of treatment.     Eyes:  Visual scanning/ tracking without deficit.     Ears:  Response to auditory stimuli is normal.  No  hearing aid devices.  Auricles in proper alignment.     Lymphatic:  Popliteal lymph nodes not enlarged.     Lungs:  Non labored breathing, non labored speech. No cough.  No audible wheezing. Even, quiet breathing.       Vascular:  Pedal pulses are palpable bilaterally for both the DP and PT arteries.  CFT < 3 sec.  No edema.  No varicosities.  Pedal hair growth noted.     Neuro:  Alert and oriented x 3. Coordinated gait.  Light touch sensation is intact to the L4, L5, S1 distributions. No obvious deficits.  No evidence of neurological-based weakness, spasticity, or contracture in the lower extremities.    Derm: Normal texture and turgor.  No erythema, ecchymosis, or cyanosis.  No open lesions.     Musculoskeletal:    Lower extremity muscle strength is normal.  Patient is ambulatory without an assistive device or brace.  No gross deformities.  Normal ROM all fore foot and rearfoot joints.  No equinus.  With weightbearing patient has bilateral pronation.  No pain with ROM.   Pain with palpation posterior tibial tendon and spring ligament left.  negative pain with stressing " posterior tibial tendon.  Good calcaneal iversion with foot flexion.  negative erythema.  negative edema.  negative ecchymosis.   negative masses noted.      Radiographic:  X-rays normal.    A:  Pronation and posterior tibial tendonitis    P:  X-rays taken today.  Discussed the cause of this with the patient.  Patient has ankle brace and he states this has helped him.  He will continue wearing this.   ice bid.  Minimize activities until healed.  Explained must have good support for feet at all times or could develop tear in tendon and may need surgery.    Discussed importance of wearing these in a good shoe at all times to prevent future problems.  Wrote prescription for orthotics.  RETURN TO CLINIC PRN.  Thank you for allowing me participate in the care of this patient.        Remigio Peace DPM, FACFAS

## 2018-09-11 DIAGNOSIS — M76.822 POSTERIOR TIBIAL TENDINITIS OF LEFT LEG: Primary | ICD-10-CM

## 2018-11-08 ENCOUNTER — OFFICE VISIT (OUTPATIENT)
Dept: FAMILY MEDICINE | Facility: CLINIC | Age: 53
End: 2018-11-08
Payer: COMMERCIAL

## 2018-11-08 VITALS
WEIGHT: 244 LBS | DIASTOLIC BLOOD PRESSURE: 71 MMHG | BODY MASS INDEX: 31.33 KG/M2 | TEMPERATURE: 96.7 F | SYSTOLIC BLOOD PRESSURE: 115 MMHG | HEART RATE: 84 BPM | OXYGEN SATURATION: 96 %

## 2018-11-08 DIAGNOSIS — J01.10 ACUTE NON-RECURRENT FRONTAL SINUSITIS: Primary | ICD-10-CM

## 2018-11-08 DIAGNOSIS — R05.9 COUGH: ICD-10-CM

## 2018-11-08 PROCEDURE — 99213 OFFICE O/P EST LOW 20 MIN: CPT | Performed by: FAMILY MEDICINE

## 2018-11-08 RX ORDER — AZITHROMYCIN 250 MG/1
TABLET, FILM COATED ORAL
Qty: 6 TABLET | Refills: 0 | Status: SHIPPED | OUTPATIENT
Start: 2018-11-08 | End: 2020-11-11

## 2018-11-08 RX ORDER — FLUTICASONE PROPIONATE 50 MCG
1-2 SPRAY, SUSPENSION (ML) NASAL DAILY
Qty: 3 BOTTLE | Refills: 11 | Status: SHIPPED | OUTPATIENT
Start: 2018-11-08 | End: 2020-11-11

## 2018-11-08 RX ORDER — BENZONATATE 200 MG/1
200 CAPSULE ORAL 3 TIMES DAILY PRN
Qty: 21 CAPSULE | Refills: 0 | Status: SHIPPED | OUTPATIENT
Start: 2018-11-08 | End: 2020-11-11

## 2018-11-08 ASSESSMENT — PAIN SCALES - GENERAL: PAINLEVEL: NO PAIN (0)

## 2018-11-08 NOTE — MR AVS SNAPSHOT
"              After Visit Summary   2018    Jorge Rivas    MRN: [id number]           Patient Information     Date Of Birth          1965        Visit Information        Provider Department      2018 4:00 PM Clifford Swann MD [facility]        Today's Diagnoses     Cough    -  1    Acute non-recurrent frontal sinusitis           Follow-ups after your visit        Follow-up notes from your care team     Return if symptoms worsen or fail to improve.      Who to contact     If you have questions or need follow up information about today's clinic visit or your schedule please contact [facility] directly at [phone].  Normal or non-critical lab and imaging results will be communicated to you by MyChart, letter or phone within 4 business days after the clinic has received the results. If you do not hear from us within 7 days, please contact the clinic through MyChart or phone. If you have a critical or abnormal lab result, we will notify you by phone as soon as possible.  Submit refill requests through [facility] or call your pharmacy and they will forward the refill request to us. Please allow 3 business days for your refill to be completed.          Additional Information About Your Visit        MyChart Information     [facility] lets you send messages to your doctor, view your test results, renew your prescriptions, schedule appointments and more. To sign up, go to [email or web address] . Click on \"Log in\" on the left side of the screen, which will take you to the Welcome page. Then click on \"Sign up Now\" on the right side of the page.     You will be asked to enter the access code listed below, as well as some personal information. Please follow the directions to create your username and password.     Your access code is: [email or web address]  Expires: 2018  2:23 PM     Your access code will  in 90 days. If you need help or a new code, " please call your [place] clinic or [phone].        Care EveryWhere ID     This is your Care EveryWhere ID. This could be used by other organizations to access your [place] medical records  [id number]        Your Vitals Were     Pulse Temperature Pulse Oximetry BMI (Body Mass Index)          84 96.7  F (35.9  C) (Oral) 96% 31.33 kg/m2         Blood Pressure from Last 3 Encounters:   11/08/18 115/71   08/24/18 130/78   08/21/18 124/76    Weight from Last 3 Encounters:   11/08/18 244 lb (110.7 kg)   08/24/18 245 lb (111.1 kg)   08/21/18 246 lb (111.6 kg)              Today, you had the following     No orders found for display         Today's Medication Changes          These changes are accurate as of 11/8/18  4:24 PM.  If you have any questions, ask your nurse or doctor.               Start taking these medicines.        Dose/Directions    azithromycin 250 MG tablet   Commonly known as:  ZITHROMAX   Used for:  Acute non-recurrent frontal sinusitis   Started by:  Clifford Swann MD        Two tablets first day, then one tablet daily for four days.   Quantity:  6 tablet   Refills:  0       benzonatate 200 MG capsule   Commonly known as:  TESSALON   Used for:  Cough   Started by:  Clifford Swann MD        Dose:  200 mg   Take 1 capsule (200 mg) by mouth 3 times daily as needed for cough   Quantity:  21 capsule   Refills:  0       fluticasone 50 MCG/ACT spray   Commonly known as:  FLONASE   Used for:  Cough   Started by:  Clifford Swann MD        Dose:  1-2 spray   Spray 1-2 sprays into both nostrils daily   Quantity:  3 Bottle   Refills:  11            Where to get your medicines      These medications were sent to [facility] [address] [place], [place] - [address] AT [facility] [address]  [address], [facility] [address]     Phone:  [phone]     azithromycin 250 MG tablet    benzonatate 200 MG capsule    fluticasone 50 MCG/ACT spray                Primary Care Provider  Office Phone # Fax #    Jaydon Mead [phone]       [address] [facility] [address]        Equal Access to Services     NORA [place]STER : Hadii aad ku hadfrancheskaabe Larose, wasatishda eyaleulogioha, qabarbrata kajacquelineda tray, moses corralesmariaelena dela cruzabe arboleda ab barahona. So [facility] [phone].    ATENCIÓN: Si habla español, tiene a espino disposición servicios gratuitos de asistencia lingüística. Llame al [phone].    We comply with applicable federal civil rights laws and [place] laws. We do not discriminate on the basis of race, color, national origin, age, disability, sex, sexual orientation, or gender identity.            Thank you!     Thank you for choosing [facility]  for your care. Our goal is always to provide you with excellent care. Hearing back from our patients is one way we can continue to improve our services. Please take a few minutes to complete the written survey that you may receive in the mail after your visit with us. Thank you!             Your Updated Medication List - Protect others around you: Learn how to safely use, store and throw away your medicines at www.disposemymeds.org.          This list is accurate as of 11/8/18  4:24 PM.  Always use your most recent med list.                   Brand Name Dispense Instructions for use Diagnosis    aspirin 81 MG tablet      Take by mouth daily        azithromycin 250 MG tablet    ZITHROMAX    6 tablet    Two tablets first day, then one tablet daily for four days.    Acute non-recurrent frontal sinusitis       benzonatate 200 MG capsule    TESSALON    21 capsule    Take 1 capsule (200 mg) by mouth 3 times daily as needed for cough    Cough       DAILY MULTIVITAMIN PO           fluocinonide 0.05 % cream    LIDEX    30 g    Apply sparingly to affected area twice daily for 14 days.  Do not apply to face.    Eczema       fluticasone 50 MCG/ACT spray    FLONASE    3 Bottle    Spray 1-2 sprays into both nostrils  daily    Cough       LORATADINE PO           methylPREDNISolone 4 MG tablet    MEDROL DOSEPAK    21 tablet    Follow package instructions    Wasp sting, undetermined intent, initial encounter       sildenafil 100 MG tablet    VIAGRA    6 tablet    Take 0.5-1 tablets ( mg) by mouth daily as needed for erectile dysfunction Take 30 min to 4 hours before intercourse.  Never use with nitroglycerin, terazosin or doxazosin.    ED (erectile dysfunction)

## 2018-11-08 NOTE — PROGRESS NOTES
SUBJECTIVE:                                                    Jorge Rivas is a 53 year old male who presents to clinic today for the following health issues:    ENT Symptoms             Symptoms: cc Present Absent Comment   Fever/Chills   x    Fatigue   x    Muscle Aches   x    Eye Irritation   x    Sneezing   x    Nasal Mehrdad/Drg  x     Sinus Pressure/Pain   x    Loss of smell  x     Dental pain   x    Sore Throat   x    Swollen Glands   x    Ear Pain/Fullness  x  Left ear   Cough  x     Wheeze   x    Chest Pain   x    Shortness of breath   x    Rash   x    Other   x      Symptom duration: One month   Symptom severity:  moderate to severe   Treatments tried:  Antibiotics   Contacts:  school kids   SUBJECTIVE: Jorge Rivas is a 53 year old male patient complaining of sinuses pain and pressure, cough and fullness in ears. for 1 month(s).   [facility] Given Doxy, felt better, now start having more sinuses pain, post nasal drip and cough.  No fever or chills. Denies chest pain or short of breath.    OBJECTIVE: The patient appears healthy, alert and no distress.   EARS: External ears normal. Canals clear. TM's normal.  NOSE/SINUS: Nares normal. Septum midline. Mucosa normal. No drainage or sinus tenderness.  Sinus palpation: Frontal sinus and Maxillary sinus nontender to palpation   THROAT:  mild erythema   NECK:Neck supple. No adenopathy. Thyroid symmetric, normal size,   CHEST: Clear    ASSESSMENT: Acute Sinusitis  (J01.10) Acute non-recurrent frontal sinusitis  (primary encounter diagnosis)    Plan: azithromycin (ZITHROMAX) 250 MG tablet    (R05) Cough    Plan: fluticasone (FLONASE) 50 MCG/ACT spray,         benzonatate (TESSALON) 200 MG capsule      PLAN: See orders.   In addition, I have suggested that the patient     Clifford Swann MD

## 2019-01-10 ENCOUNTER — TELEPHONE (OUTPATIENT)
Dept: FAMILY MEDICINE | Facility: CLINIC | Age: 54
End: 2019-01-10

## 2019-01-10 ENCOUNTER — NURSE TRIAGE (OUTPATIENT)
Dept: NURSING | Facility: CLINIC | Age: 54
End: 2019-01-10

## 2019-01-10 ENCOUNTER — OFFICE VISIT (OUTPATIENT)
Dept: FAMILY MEDICINE | Facility: CLINIC | Age: 54
End: 2019-01-10
Payer: COMMERCIAL

## 2019-01-10 VITALS
SYSTOLIC BLOOD PRESSURE: 128 MMHG | WEIGHT: 245 LBS | HEART RATE: 90 BPM | BODY MASS INDEX: 31.46 KG/M2 | OXYGEN SATURATION: 96 % | TEMPERATURE: 97.8 F | DIASTOLIC BLOOD PRESSURE: 79 MMHG

## 2019-01-10 DIAGNOSIS — Z20.2 STD EXPOSURE: ICD-10-CM

## 2019-01-10 DIAGNOSIS — H60.312 ACUTE DIFFUSE OTITIS EXTERNA OF LEFT EAR: ICD-10-CM

## 2019-01-10 DIAGNOSIS — H60.312 ACUTE DIFFUSE OTITIS EXTERNA OF LEFT EAR: Primary | ICD-10-CM

## 2019-01-10 PROCEDURE — 87491 CHLMYD TRACH DNA AMP PROBE: CPT | Performed by: FAMILY MEDICINE

## 2019-01-10 PROCEDURE — 87591 N.GONORRHOEAE DNA AMP PROB: CPT | Performed by: FAMILY MEDICINE

## 2019-01-10 PROCEDURE — 99213 OFFICE O/P EST LOW 20 MIN: CPT | Performed by: FAMILY MEDICINE

## 2019-01-10 ASSESSMENT — PAIN SCALES - GENERAL: PAINLEVEL: NO PAIN (0)

## 2019-01-10 NOTE — TELEPHONE ENCOUNTER
Reason for Call:  Other prescription    Detailed comments: Patient states the pharmacy, [facility] [address], doesn't have that medication in stock.  Patient would like Dr. Swann to re-send the script to the [facility] and he will get it from them.       Phone Number Patient can be reached at: Cell number on file:    Telephone Information:   Mobile [phone]       Best Time: anytime    Can we leave a detailed message on this number? YES    Call taken on 1/10/2019 at 4:51 PM by Stephany Rausch

## 2019-01-10 NOTE — PATIENT INSTRUCTIONS
Patient Education     External Ear Infection (Adult)    External otitis (also called  swimmer s ear ) is an infection in the ear canal. It is often caused by bacteria or fungus. It can occur a few days after water gets trapped in the ear canal (from swimming or bathing). It can also occur after cleaning too deeply in the ear canal with a cotton swab or other object. Sometimes, hair care products get into the ear canal and cause this problem.  Symptoms can include pain, fever, itching, redness, drainage, or swelling of the ear canal. Temporary hearing loss may also occur.  Home care    Do not try to clean the ear canal. This can push pus and bacteria deeper into the canal.    Use prescribed ear drops as directed. These help reduce swelling and fight the infection. If an ear wick was placed in the ear canal, apply drops right onto the end of the wick. The wick will draw the medicine into the ear canal even if it is swollen closed.    A cotton ball may be loosely placed in the outer ear to absorb any drainage.    You may use acetaminophen or ibuprofen to control pain, unless another medicine was prescribed. Note: If you have chronic liver or kidney disease or ever had a stomach ulcer or GI bleeding, talk to your healthcare provider before taking any of these medicines.    Do not allow water to get into your ear when bathing. Also, don't swim until the infection has cleared.  Prevention    Keep your ears dry. This helps lower the risk of infection. Dry your ears with a towel or hair dryer after getting wet. Also, use ear plugs when swimming.    Do not stick any objects in the ear to remove wax.    If you feel water trapped in your ear, use ear drops right away. You can get these drops over the counter at most drugstores. They work by removing water from the ear canal.  Follow-up care  Follow up with your healthcare provider in 1 week, or as advised.  When to seek medical advice  Call your healthcare provider right  away if any of these occur:    Ear pain becomes worse or doesn t improve after 3 days of treatment    Redness or swelling of the outer ear occurs or gets worse    Headache    Painful or stiff neck    Drowsiness or confusion    Fever of 100.4 F (38 C) or higher, or as directed by your healthcare provider    Seizure  Date Last Reviewed: 10/1/2017    3314-8675 [employer]. [address] [place] [address]. All rights reserved. This information is not intended as a substitute for professional medical care. Always follow your healthcare professional's instructions.

## 2019-01-10 NOTE — TELEPHONE ENCOUNTER
"    Reason for Disposition    Diagnosis is uncertain    Additional Information    Negative: Recently examined and diagnosed with \"Otitis Externa\" or \"Swimmer's Ear\"    Negative: [1] Stiff neck (unable to touch chin to chest) AND [2] fever    Negative: [1] Stiff neck (unable to touch chin to chest) AND [2] headache    Negative: Bony area of skull behind the ear is pink or swollen    Negative: Patient sounds very sick or weak to the triager    Negative: [1] SEVERE pain and [2] not improved 2 hours after analgesic medication (e.g., ibuprofen or acetaminophen)    Negative: Fever > 100.5 F (38.1 C)    Negative: Outer ear (ear lobe) is red and swollen    Negative: [1] Stiff neck (unable to touch chin to chest) AND [2] no fever or headache    Negative: Diabetes mellitus or weak immune system (e.g., HIV positive, cancer chemotherapy, transplant patient)    Negative: Yellow or green discharge from ear canal    Negative: Decreased hearing (or another adult says that the ear canal is completely blocked with discharge)    Negative: Redness of outer ear    Negative: Swollen lymph node near ear    Protocols used: EAR - SWIMMER'S-ADULT-    Call[place] the [place] 4 [place]. Caller now states that his inner ear feels like there is water remaining in ear. Caller states he has been using over the counter medication to alleviate symptoms. Triage guidelines recommend to see pcp within 24 hours. Caller verbalized and understands directives.  "

## 2019-01-10 NOTE — LETTER
[facility]   [address] [place]  [address]  [phone]                                   January 14, 2019    Jogre Rivas  [address] [facility] [address]        Dear Felix,    The results of your recent labs were within normal limits.    Results for orders placed or performed in visit on 01/10/19   Chlamydia trachomatis PCR   Result Value Ref Range    Specimen Description Urine     Chlamydia Trachomatis PCR Negative NEG^Negative   Neisseria gonorrhoeae PCR   Result Value Ref Range    Specimen Descrip Urine     N Gonorrhea PCR Negative NEG^Negative       If you have any questions please call the clinic at [phone]    Sincerely,    Clifford Swann MD  bmd

## 2019-01-10 NOTE — TELEPHONE ENCOUNTER
Routed to provider. Medication and pharmacy cued. [facility] will need to be printed and faxed.     Magui Gonzalez RN

## 2019-01-10 NOTE — PROGRESS NOTES
SUBJECTIVE:                                                    Jorge Rivas is a 53 year old male who presents to clinic today for the following health issues:    Patient reported having bilateral plugged ear. He stated that he used OTC ear medication with relief. Patient also has concerns about bleeding nose.  (S) 53 year old male complains of pain in left ear for a few weeks, since he return from vacation, he report was swimming a few times. No fever or URI symptoms. Has been swimming.    (O) He appears well, afebrile. Left ear reveals tenderness of the tragus; debris and inflammation in external canal. TM is not well seen due to debris, but visualized aspects appear normal.    (A) Otitis Externa  (H60.312) Acute diffuse otitis externa of left ear  (primary encounter diagnosis)  Plan: ciprofloxacin-hydrocortisone (CIPRO HC OTIC)         0.2-1 % otic suspension      (Z20.2) STD exposure    Plan: Chlamydia trachomatis PCR, Neisseria         gonorrhoeae PCR    (P) Instructed to keep ear dry until better; eardrops per orders, call if persistent pain, swelling or fever, FUV prn.    Clifford Swann MD

## 2019-01-10 NOTE — TELEPHONE ENCOUNTER
Dr. Swann handed me a printed/signed Rx for the cipro-hydrocortisone ear drops.    Faxed to [facility] now.    I called patient at mobile number indicated in first note; no answer, detailed message left as requested on voicemail identified as Felix Rivas advising requested Rx was [facility].    Rebecca Lauren RN  [facility]

## 2019-01-11 LAB
C TRACH DNA SPEC QL NAA+PROBE: NEGATIVE
N GONORRHOEA DNA SPEC QL NAA+PROBE: NEGATIVE
SPECIMEN SOURCE: NORMAL
SPECIMEN SOURCE: NORMAL

## 2019-07-16 NOTE — LETTER
June 30, 2017    Jorge Rivas  [address] [id number]      Dear Jorge Rivas,     We have tried to contact you about your health, but have been unable to reach you.  Please call us as soon as possible so we can provide you with the best care possible.  We will continue to check in with you throughout the year to complete these items of care, if you are not able to complete these items at this time.  If you would like to complete the missing items for your care, please contact us at [phone].    We recommend the following:  -schedule a COLONOSCOPY to look for colon cancer (due every 10 years or 5 years in higher risk situations.)   Colon cancer is now the second leading cause of death in the [place] for both men and women and there are over 130,000 new cases and 50,000 deaths per year from colon cancer.  Colonoscopies can prevent 90-95% of these deaths.  Problem lesions can be removed before they ever become cancer.  This test is not only looking for cancer, but also getting rid of precancerious lesions.  If you do not wish to do a colonoscopy or cannot afford to do one, at this time, there is another option. It is called a     Sincerely,     Your Care Team at [facility]         [Patient] : patient [Family Member] : family member

## 2019-11-03 ENCOUNTER — HEALTH MAINTENANCE LETTER (OUTPATIENT)
Age: 54
End: 2019-11-03

## 2020-11-11 ENCOUNTER — OFFICE VISIT (OUTPATIENT)
Dept: FAMILY MEDICINE | Facility: CLINIC | Age: 55
End: 2020-11-11
Payer: COMMERCIAL

## 2020-11-11 VITALS
HEART RATE: 72 BPM | WEIGHT: 246.13 LBS | DIASTOLIC BLOOD PRESSURE: 82 MMHG | BODY MASS INDEX: 31.59 KG/M2 | HEIGHT: 74 IN | TEMPERATURE: 97.8 F | SYSTOLIC BLOOD PRESSURE: 121 MMHG

## 2020-11-11 DIAGNOSIS — Z00.00 ROUTINE GENERAL MEDICAL EXAMINATION AT A HEALTH CARE FACILITY: Primary | ICD-10-CM

## 2020-11-11 DIAGNOSIS — R53.83 FATIGUE, UNSPECIFIED TYPE: ICD-10-CM

## 2020-11-11 DIAGNOSIS — Z11.3 SCREEN FOR STD (SEXUALLY TRANSMITTED DISEASE): ICD-10-CM

## 2020-11-11 DIAGNOSIS — E78.5 HYPERLIPIDEMIA LDL GOAL <100: ICD-10-CM

## 2020-11-11 DIAGNOSIS — Z86.0100 HISTORY OF COLONIC POLYPS: ICD-10-CM

## 2020-11-11 DIAGNOSIS — Z13.1 SCREENING FOR DIABETES MELLITUS: ICD-10-CM

## 2020-11-11 DIAGNOSIS — K21.9 GASTROESOPHAGEAL REFLUX DISEASE, UNSPECIFIED WHETHER ESOPHAGITIS PRESENT: ICD-10-CM

## 2020-11-11 DIAGNOSIS — Z12.5 SCREENING FOR PROSTATE CANCER: ICD-10-CM

## 2020-11-11 LAB
ALBUMIN SERPL-MCNC: 3.8 G/DL (ref 3.4–5)
ALP SERPL-CCNC: 61 U/L (ref 40–150)
ALT SERPL W P-5'-P-CCNC: 39 U/L (ref 0–70)
ANION GAP SERPL CALCULATED.3IONS-SCNC: 4 MMOL/L (ref 3–14)
AST SERPL W P-5'-P-CCNC: 23 U/L (ref 0–45)
BASOPHILS # BLD AUTO: 0 10E9/L (ref 0–0.2)
BASOPHILS NFR BLD AUTO: 0.3 %
BILIRUB SERPL-MCNC: 0.6 MG/DL (ref 0.2–1.3)
BUN SERPL-MCNC: 18 MG/DL (ref 7–30)
CALCIUM SERPL-MCNC: 8.8 MG/DL (ref 8.5–10.1)
CHLORIDE SERPL-SCNC: 108 MMOL/L (ref 94–109)
CHOLEST SERPL-MCNC: 204 MG/DL
CO2 SERPL-SCNC: 27 MMOL/L (ref 20–32)
CREAT SERPL-MCNC: 0.96 MG/DL (ref 0.66–1.25)
DIFFERENTIAL METHOD BLD: NORMAL
EOSINOPHIL # BLD AUTO: 0.3 10E9/L (ref 0–0.7)
EOSINOPHIL NFR BLD AUTO: 4.3 %
ERYTHROCYTE [DISTWIDTH] IN BLOOD BY AUTOMATED COUNT: 12 % (ref 10–15)
GFR SERPL CREATININE-BSD FRML MDRD: 88 ML/MIN/{1.73_M2}
GLUCOSE SERPL-MCNC: 100 MG/DL (ref 70–99)
HBA1C MFR BLD: 5.3 % (ref 0–5.6)
HBV SURFACE AG SERPL QL IA: NONREACTIVE
HCT VFR BLD AUTO: 43.5 % (ref 40–53)
HCV AB SERPL QL IA: NONREACTIVE
HDLC SERPL-MCNC: 46 MG/DL
HGB BLD-MCNC: 15.1 G/DL (ref 13.3–17.7)
HIV 1+2 AB+HIV1 P24 AG SERPL QL IA: NONREACTIVE
LDLC SERPL CALC-MCNC: 131 MG/DL
LYMPHOCYTES # BLD AUTO: 2.1 10E9/L (ref 0.8–5.3)
LYMPHOCYTES NFR BLD AUTO: 32.9 %
MCH RBC QN AUTO: 30.1 PG (ref 26.5–33)
MCHC RBC AUTO-ENTMCNC: 34.7 G/DL (ref 31.5–36.5)
MCV RBC AUTO: 87 FL (ref 78–100)
MONOCYTES # BLD AUTO: 0.7 10E9/L (ref 0–1.3)
MONOCYTES NFR BLD AUTO: 10.6 %
NEUTROPHILS # BLD AUTO: 3.3 10E9/L (ref 1.6–8.3)
NEUTROPHILS NFR BLD AUTO: 51.9 %
NONHDLC SERPL-MCNC: 158 MG/DL
PLATELET # BLD AUTO: 196 10E9/L (ref 150–450)
POTASSIUM SERPL-SCNC: 4.3 MMOL/L (ref 3.4–5.3)
PROT SERPL-MCNC: 7.2 G/DL (ref 6.8–8.8)
PSA SERPL-ACNC: 1.25 UG/L (ref 0–4)
RBC # BLD AUTO: 5.01 10E12/L (ref 4.4–5.9)
SODIUM SERPL-SCNC: 139 MMOL/L (ref 133–144)
TRIGL SERPL-MCNC: 137 MG/DL
TSH SERPL DL<=0.005 MIU/L-ACNC: 2.62 MU/L (ref 0.4–4)
WBC # BLD AUTO: 6.3 10E9/L (ref 4–11)

## 2020-11-11 PROCEDURE — 86803 HEPATITIS C AB TEST: CPT | Performed by: FAMILY MEDICINE

## 2020-11-11 PROCEDURE — 99396 PREV VISIT EST AGE 40-64: CPT | Performed by: FAMILY MEDICINE

## 2020-11-11 PROCEDURE — 87591 N.GONORRHOEAE DNA AMP PROB: CPT | Performed by: FAMILY MEDICINE

## 2020-11-11 PROCEDURE — 99000 SPECIMEN HANDLING OFFICE-LAB: CPT | Performed by: FAMILY MEDICINE

## 2020-11-11 PROCEDURE — 86696 HERPES SIMPLEX TYPE 2 TEST: CPT | Performed by: FAMILY MEDICINE

## 2020-11-11 PROCEDURE — 86780 TREPONEMA PALLIDUM: CPT | Mod: 90 | Performed by: FAMILY MEDICINE

## 2020-11-11 PROCEDURE — 83036 HEMOGLOBIN GLYCOSYLATED A1C: CPT | Performed by: FAMILY MEDICINE

## 2020-11-11 PROCEDURE — 80061 LIPID PANEL: CPT | Performed by: FAMILY MEDICINE

## 2020-11-11 PROCEDURE — 80050 GENERAL HEALTH PANEL: CPT | Performed by: FAMILY MEDICINE

## 2020-11-11 PROCEDURE — 87389 HIV-1 AG W/HIV-1&-2 AB AG IA: CPT | Performed by: FAMILY MEDICINE

## 2020-11-11 PROCEDURE — 87340 HEPATITIS B SURFACE AG IA: CPT | Performed by: FAMILY MEDICINE

## 2020-11-11 PROCEDURE — 87491 CHLMYD TRACH DNA AMP PROBE: CPT | Performed by: FAMILY MEDICINE

## 2020-11-11 PROCEDURE — G0103 PSA SCREENING: HCPCS | Performed by: FAMILY MEDICINE

## 2020-11-11 PROCEDURE — 86695 HERPES SIMPLEX TYPE 1 TEST: CPT | Performed by: FAMILY MEDICINE

## 2020-11-11 RX ORDER — FAMOTIDINE 20 MG/1
20 TABLET, FILM COATED ORAL 2 TIMES DAILY
Qty: 60 TABLET | Refills: 1 | Status: SHIPPED | OUTPATIENT
Start: 2020-11-11

## 2020-11-11 RX ORDER — ASPIRIN 81 MG/1
81 TABLET ORAL DAILY
COMMUNITY

## 2020-11-11 RX ORDER — MULTIPLE VITAMINS W/ MINERALS TAB 9MG-400MCG
1 TAB ORAL DAILY
COMMUNITY

## 2020-11-11 ASSESSMENT — ENCOUNTER SYMPTOMS
DIARRHEA: 0
NERVOUS/ANXIOUS: 1
JOINT SWELLING: 0
WEAKNESS: 0
SHORTNESS OF BREATH: 0
SORE THROAT: 0
ARTHRALGIAS: 0
HEARTBURN: 0
ABDOMINAL PAIN: 0
COUGH: 0
CHILLS: 0
HEMATOCHEZIA: 0
CONSTIPATION: 0
HEADACHES: 0
DIZZINESS: 0
PALPITATIONS: 0
DYSURIA: 0
FEVER: 0
MYALGIAS: 0
FREQUENCY: 1
EYE PAIN: 0
NAUSEA: 0
HEMATURIA: 0
PARESTHESIAS: 0

## 2020-11-11 ASSESSMENT — MIFFLIN-ST. JEOR: SCORE: 2019.55

## 2020-11-11 NOTE — PATIENT INSTRUCTIONS
Increase exercise    We will send you lab results    Call with problems/ question    Increase fluid intake    Follow up as needed based on symptoms    Minimize saturated fats and avoid trans fats

## 2020-11-11 NOTE — PROGRESS NOTES
SUBJECTIVE:   CC: Jorge Rivas is an 55 year old male who presents for preventative health visit.        Patient has been advised of split billing requirements and indicates understanding: Yes  Healthy Habits:     Getting at least 3 servings of Calcium per day:  Yes    Bi-annual eye exam:  NO    Dental care twice a year:  Yes    Sleep apnea or symptoms of sleep apnea:  Daytime drowsiness    Diet:  Regular (no restrictions)    Frequency of exercise:  2-3 days/week    Duration of exercise:  15-30 minutes    Taking medications regularly:  Not Applicable    PHQ-2 Total Score: 1    Additional concerns today:  No               Today's PHQ-2 Score:   PHQ-2 (  [employer]) 2020   Q1: Little interest or pleasure in doing things 0   Q2: Feeling down, depressed or hopeless 1   PHQ-2 Score 1   Q1: Little interest or pleasure in doing things Not at all   Q2: Feeling down, depressed or hopeless Several days   PHQ-2 Score 1       Abuse: Current or Past(Physical, Sexual or Emotional)- No  Do you feel safe in your environment? Yes    Have you ever done Advance Care Planning? (For example, a Health Directive, POLST, or a discussion with a medical provider or your loved ones about your wishes): No, advance care planning information given to patient to review.  Patient plans to discuss their wishes with loved ones or provider.      Social History     Tobacco Use     Smoking status: Former Smoker     Quit date: 2006     Years since quittin.8     Smokeless tobacco: Never Used   Substance Use Topics     Alcohol use: Yes     Alcohol/week: 0.0 standard drinks     Comment: variable     If you drink alcohol do you typically have >3 drinks per day or >7 drinks per week? Yes      Alcohol Use 2020   Prescreen: >3 drinks/day or >7 drinks/week? No   Prescreen: >3 drinks/day or >7 drinks/week? -   No flowsheet data found.    Last PSA:   PSA   Date Value Ref Range Status   2017 1.29 0 - 4 ug/L Final     Comment:  "    Assay Method:  Chemiluminescence using Siemens Vista analyzer       Reviewed orders with patient. Reviewed health maintenance and updated orders accordingly - Yes       Reviewed and updated as needed this visit by clinical staff  Tobacco  Allergies  Meds   Med Hx  Surg Hx  Fam Hx  Soc Hx        Reviewed and updated as needed this visit by Provider                    Review of Systems   Constitutional: Negative for chills and fever.   HENT: Positive for congestion. Negative for ear pain, hearing loss and sore throat.    Eyes: Negative for pain and visual disturbance.   Respiratory: Negative for cough and shortness of breath.    Cardiovascular: Negative for chest pain, palpitations and peripheral edema.   Gastrointestinal: Negative for abdominal pain, constipation, diarrhea, heartburn, hematochezia and nausea.   Genitourinary: Positive for frequency. Negative for discharge, dysuria, genital sores, hematuria, impotence and urgency.   Musculoskeletal: Negative for arthralgias, joint swelling and myalgias.   Skin: Negative for rash.   Neurological: Negative for dizziness, weakness, headaches and paresthesias.   Psychiatric/Behavioral: Negative for mood changes. The patient is nervous/anxious.       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    [employer]    Not much exercise since covid          OBJECTIVE:   /82 (BP Location: Left arm, Patient Position: Chair, Cuff Size: Adult Regular)   Pulse 72   Temp 97.8  F (36.6  C) (Oral)   Ht 1.885 m (6' 2.21\")   Wt 111.6 kg (246 lb 2 oz)   BMI 31.42 kg/m      Physical Exam  GENERAL: healthy, alert and no distress  EYES: Eyes grossly " normal to inspection, PERRL and conjunctivae and sclerae normal  HENT: ear canals and TM's normal, nose and mouth without ulcers or lesions  NECK: no adenopathy, no asymmetry, masses, or scars and thyroid normal to palpation  RESP: lungs clear to auscultation - no rales, rhonchi or wheezes  CV: regular rate and rhythm, normal S1 S2, no S3 or S4, no murmur, click or rub, no peripheral edema and peripheral pulses strong  ABDOMEN: soft, nontender, no hepatosplenomegaly, no masses and bowel sounds normal  MS: no gross musculoskeletal defects noted, no edema  SKIN: no suspicious lesions or rashes  NEURO: Normal strength and tone, mentation intact and speech normal  PSYCH: mentation appears normal, affect normal/bright    Diagnostic Test Results:  Labs reviewed in Epic    ASSESSMENT/PLAN:   Jorge was seen today for physical and health maintenance.    Diagnoses and all orders for this visit:    Routine general medical examination at a health care facility    Gastroesophageal reflux disease, unspecified whether esophagitis present  -     famotidine (PEPCID) 20 MG tablet; Take 1 tablet (20 mg) by mouth 2 times daily    History of colonic polyps  -     GASTROENTEROLOGY ADULT REF PROCEDURE ONLY; Future    Screening for prostate cancer  -     Prostate spec antigen screen    Fatigue, unspecified type  -     TSH with free T4 reflex  -     CBC with platelets differential    Hyperlipidemia LDL goal <100  -     Lipid panel reflex to direct LDL Fasting  -     Comprehensive metabolic panel    Screening for diabetes mellitus  -     Hemoglobin A1c    Screen for STD (sexually transmitted disease)  -     Hepatitis C antibody  -     Hepatitis B surface antigen  -     Chlamydia trachomatis PCR  -     Neisseria gonorrhoeae PCR  -     Herpes Simplex Virus 1 and 2 IgG  -     HIV Antigen Antibody Combo  -     Treponema Abs w Reflex to RPR and Titer    Discussed multiple issues with patient  Due for colonoscopy , ordered.  Patient to call  "and schedule  Check labs fasting  Patient wanted std testing, no symptoms  Get back into exercise  Hold off on anxiety meds for now  Stay well hydrated  Follow up as needed based on symptoms   Trial of h2 blocker    Patient has been advised of split billing requirements and indicates understanding: Yes  COUNSELING:   Reviewed preventive health counseling, as reflected in patient instructions       Regular exercise       Healthy diet/nutrition       Vision screening       Alcohol Use        Family planning       Safe sex practices/STD prevention       Colon cancer screening       Prostate cancer screening    Estimated body mass index is 31.42 kg/m  as calculated from the following:    Height as of this encounter: 1.885 m (6' 2.21\").    Weight as of this encounter: 111.6 kg (246 lb 2 oz).     Weight management plan: Discussed healthy diet and exercise guidelines    He reports that he quit smoking about 14 years ago. He has never used smokeless tobacco.      Counseling Resources:  ATP IV Guidelines  Pooled Cohorts Equation Calculator  FRAX Risk Assessment  ICSI Preventive Guidelines  Dietary Guidelines for Americans, 2010  USDA's MyPlate  ASA Prophylaxis  Lung CA Screening    Jaydon Mead MD  [facility]  "

## 2020-11-12 LAB
C TRACH DNA SPEC QL NAA+PROBE: NEGATIVE
N GONORRHOEA DNA SPEC QL NAA+PROBE: NEGATIVE
SPECIMEN SOURCE: NORMAL
SPECIMEN SOURCE: NORMAL
T PALLIDUM AB SER QL: NONREACTIVE

## 2020-11-13 LAB
HSV1 IGG SERPL QL IA: <0.2 AI (ref 0–0.8)
HSV2 IGG SERPL QL IA: <0.2 AI (ref 0–0.8)

## 2020-11-13 NOTE — RESULT ENCOUNTER NOTE
We are still waiting for the herpes antibody test. Otherwise no evidence of STDs on the other tests.    Cholesterol is mildly elevated.  Keep working on healthy diet/exercise.    Other labs here are fine.    Jaydon Mead MD

## 2020-11-19 ENCOUNTER — TELEPHONE (OUTPATIENT)
Dept: FAMILY MEDICINE | Facility: CLINIC | Age: 55
End: 2020-11-19

## 2020-11-19 DIAGNOSIS — Z12.11 SCREEN FOR COLON CANCER: Primary | ICD-10-CM

## 2020-11-19 NOTE — TELEPHONE ENCOUNTER
Reason for Call:  Other     Detailed comments: please call the patient to discuss labs and referral for colonoscopy     Phone Number Patient can be reached at: Home number on file [phone] (home)    Best Time: any    Can we leave a detailed message on this number? YES    Call taken on 11/19/2020 at 3:01 PM by Rivka Correia

## 2020-11-19 NOTE — TELEPHONE ENCOUNTER
Attempt # 1  Called patient at home [phone] (home)  Was call answered?  Yes, patient's health insurance wants to know if is routine or diagnostic?  Routine right? Nurse sees screening on the second page of the referral?     Routing to Dr. Boston Duarte RN  [facility]

## 2020-11-20 NOTE — TELEPHONE ENCOUNTER
I called and discussed in detail with patient    Chol mildly elevated    Keep working on healthy diet/exercise     No med needed    glc marked high but barely and hemoglobin a1c fine    I reordered the colonoscopy and just put screening colon ca as dx     Patient will call insurance company and [place] [facility] to clarify    Jaydon Mead MD

## 2021-09-18 ENCOUNTER — HEALTH MAINTENANCE LETTER (OUTPATIENT)
Age: 56
End: 2021-09-18

## 2022-01-08 ENCOUNTER — HEALTH MAINTENANCE LETTER (OUTPATIENT)
Age: 57
End: 2022-01-08

## 2022-11-19 ENCOUNTER — HEALTH MAINTENANCE LETTER (OUTPATIENT)
Age: 57
End: 2022-11-19

## 2023-04-15 ENCOUNTER — HEALTH MAINTENANCE LETTER (OUTPATIENT)
Age: 58
End: 2023-04-15